# Patient Record
Sex: FEMALE | Race: WHITE | NOT HISPANIC OR LATINO
[De-identification: names, ages, dates, MRNs, and addresses within clinical notes are randomized per-mention and may not be internally consistent; named-entity substitution may affect disease eponyms.]

---

## 2017-12-18 ENCOUNTER — APPOINTMENT (OUTPATIENT)
Dept: ORTHOPEDIC SURGERY | Facility: CLINIC | Age: 62
End: 2017-12-18
Payer: COMMERCIAL

## 2017-12-18 VITALS
HEIGHT: 58 IN | DIASTOLIC BLOOD PRESSURE: 72 MMHG | BODY MASS INDEX: 25.4 KG/M2 | WEIGHT: 121 LBS | SYSTOLIC BLOOD PRESSURE: 118 MMHG | HEART RATE: 72 BPM

## 2017-12-18 DIAGNOSIS — Z78.9 OTHER SPECIFIED HEALTH STATUS: ICD-10-CM

## 2017-12-18 DIAGNOSIS — Z86.39 PERSONAL HISTORY OF OTHER ENDOCRINE, NUTRITIONAL AND METABOLIC DISEASE: ICD-10-CM

## 2017-12-18 DIAGNOSIS — Z87.39 PERSONAL HISTORY OF OTHER DISEASES OF THE MUSCULOSKELETAL SYSTEM AND CONNECTIVE TISSUE: ICD-10-CM

## 2017-12-18 DIAGNOSIS — Z82.62 FAMILY HISTORY OF OSTEOPOROSIS: ICD-10-CM

## 2017-12-18 PROCEDURE — 99244 OFF/OP CNSLTJ NEW/EST MOD 40: CPT

## 2017-12-18 PROCEDURE — 73564 X-RAY EXAM KNEE 4 OR MORE: CPT | Mod: 50

## 2017-12-18 RX ORDER — IBUPROFEN 800 MG/1
TABLET, FILM COATED ORAL
Refills: 0 | Status: ACTIVE | COMMUNITY

## 2017-12-18 RX ORDER — CANAGLIFLOZIN AND METFORMIN HYDROCHLORIDE 150; 1000 MG/1; MG/1
TABLET, FILM COATED ORAL
Refills: 0 | Status: ACTIVE | COMMUNITY

## 2017-12-26 ENCOUNTER — OUTPATIENT (OUTPATIENT)
Dept: OUTPATIENT SERVICES | Facility: HOSPITAL | Age: 62
LOS: 1 days | End: 2017-12-26
Payer: COMMERCIAL

## 2017-12-26 VITALS
SYSTOLIC BLOOD PRESSURE: 130 MMHG | HEIGHT: 59 IN | WEIGHT: 119.93 LBS | OXYGEN SATURATION: 98 % | DIASTOLIC BLOOD PRESSURE: 78 MMHG | HEART RATE: 65 BPM | RESPIRATION RATE: 16 BRPM | TEMPERATURE: 97 F

## 2017-12-26 DIAGNOSIS — M17.12 UNILATERAL PRIMARY OSTEOARTHRITIS, LEFT KNEE: ICD-10-CM

## 2017-12-26 DIAGNOSIS — Z98.890 OTHER SPECIFIED POSTPROCEDURAL STATES: Chronic | ICD-10-CM

## 2017-12-26 DIAGNOSIS — M19.90 UNSPECIFIED OSTEOARTHRITIS, UNSPECIFIED SITE: ICD-10-CM

## 2017-12-26 DIAGNOSIS — E11.9 TYPE 2 DIABETES MELLITUS WITHOUT COMPLICATIONS: ICD-10-CM

## 2017-12-26 LAB
APPEARANCE UR: CLEAR — SIGNIFICANT CHANGE UP
BILIRUB UR-MCNC: NEGATIVE — SIGNIFICANT CHANGE UP
BLD GP AB SCN SERPL QL: NEGATIVE — SIGNIFICANT CHANGE UP
BLOOD UR QL VISUAL: NEGATIVE — SIGNIFICANT CHANGE UP
BUN SERPL-MCNC: 17 MG/DL — SIGNIFICANT CHANGE UP (ref 7–23)
CALCIUM SERPL-MCNC: 8.9 MG/DL — SIGNIFICANT CHANGE UP (ref 8.4–10.5)
CHLORIDE SERPL-SCNC: 102 MMOL/L — SIGNIFICANT CHANGE UP (ref 98–107)
CO2 SERPL-SCNC: 26 MMOL/L — SIGNIFICANT CHANGE UP (ref 22–31)
COLOR SPEC: YELLOW — SIGNIFICANT CHANGE UP
CREAT SERPL-MCNC: 0.56 MG/DL — SIGNIFICANT CHANGE UP (ref 0.5–1.3)
GLUCOSE SERPL-MCNC: 79 MG/DL — SIGNIFICANT CHANGE UP (ref 70–99)
GLUCOSE UR-MCNC: 100 — HIGH
HBA1C BLD-MCNC: 5.8 % — HIGH (ref 4–5.6)
HCT VFR BLD CALC: 40.3 % — SIGNIFICANT CHANGE UP (ref 34.5–45)
HGB BLD-MCNC: 13.1 G/DL — SIGNIFICANT CHANGE UP (ref 11.5–15.5)
KETONES UR-MCNC: NEGATIVE — SIGNIFICANT CHANGE UP
LEUKOCYTE ESTERASE UR-ACNC: NEGATIVE — SIGNIFICANT CHANGE UP
MCHC RBC-ENTMCNC: 30.5 PG — SIGNIFICANT CHANGE UP (ref 27–34)
MCHC RBC-ENTMCNC: 32.5 % — SIGNIFICANT CHANGE UP (ref 32–36)
MCV RBC AUTO: 93.9 FL — SIGNIFICANT CHANGE UP (ref 80–100)
NITRITE UR-MCNC: NEGATIVE — SIGNIFICANT CHANGE UP
NRBC # FLD: 0 — SIGNIFICANT CHANGE UP
PH UR: 6 — SIGNIFICANT CHANGE UP (ref 4.6–8)
PLATELET # BLD AUTO: 261 K/UL — SIGNIFICANT CHANGE UP (ref 150–400)
PMV BLD: 10.1 FL — SIGNIFICANT CHANGE UP (ref 7–13)
POTASSIUM SERPL-MCNC: 4.2 MMOL/L — SIGNIFICANT CHANGE UP (ref 3.5–5.3)
POTASSIUM SERPL-SCNC: 4.2 MMOL/L — SIGNIFICANT CHANGE UP (ref 3.5–5.3)
PROT UR-MCNC: 10 MG/DL — SIGNIFICANT CHANGE UP
RBC # BLD: 4.29 M/UL — SIGNIFICANT CHANGE UP (ref 3.8–5.2)
RBC # FLD: 12.5 % — SIGNIFICANT CHANGE UP (ref 10.3–14.5)
RH IG SCN BLD-IMP: POSITIVE — SIGNIFICANT CHANGE UP
SODIUM SERPL-SCNC: 141 MMOL/L — SIGNIFICANT CHANGE UP (ref 135–145)
SP GR SPEC: 1.02 — SIGNIFICANT CHANGE UP (ref 1–1.04)
UROBILINOGEN FLD QL: NORMAL MG/DL — SIGNIFICANT CHANGE UP
WBC # BLD: 4.77 K/UL — SIGNIFICANT CHANGE UP (ref 3.8–10.5)
WBC # FLD AUTO: 4.77 K/UL — SIGNIFICANT CHANGE UP (ref 3.8–10.5)

## 2017-12-26 PROCEDURE — 93010 ELECTROCARDIOGRAM REPORT: CPT

## 2017-12-26 RX ORDER — GABAPENTIN 400 MG/1
300 CAPSULE ORAL ONCE
Qty: 0 | Refills: 0 | Status: COMPLETED | OUTPATIENT
Start: 2018-01-12 | End: 2018-01-12

## 2017-12-26 RX ORDER — ACETAMINOPHEN 500 MG
975 TABLET ORAL ONCE
Qty: 0 | Refills: 0 | Status: COMPLETED | OUTPATIENT
Start: 2018-01-12 | End: 2018-01-12

## 2017-12-26 RX ORDER — SODIUM CHLORIDE 9 MG/ML
1000 INJECTION, SOLUTION INTRAVENOUS
Qty: 0 | Refills: 0 | Status: DISCONTINUED | OUTPATIENT
Start: 2018-01-12 | End: 2018-01-12

## 2017-12-26 RX ORDER — PANTOPRAZOLE SODIUM 20 MG/1
40 TABLET, DELAYED RELEASE ORAL ONCE
Qty: 0 | Refills: 0 | Status: COMPLETED | OUTPATIENT
Start: 2018-01-12 | End: 2018-01-12

## 2017-12-26 RX ORDER — TRAMADOL HYDROCHLORIDE 50 MG/1
50 TABLET ORAL ONCE
Qty: 0 | Refills: 0 | Status: DISCONTINUED | OUTPATIENT
Start: 2018-01-12 | End: 2018-01-12

## 2017-12-26 NOTE — H&P PST ADULT - NEGATIVE NEUROLOGICAL SYMPTOMS
no weakness/no paresthesias/no generalized seizures/no transient paralysis/no vertigo/no loss of sensation

## 2017-12-26 NOTE — H&P PST ADULT - NEGATIVE MUSCULOSKELETAL SYMPTOMS
no arm pain L/no arm pain R/no joint swelling/no myalgia/no muscle cramps/no muscle weakness/no neck pain/no back pain/no arthralgia/no stiffness

## 2017-12-26 NOTE — H&P PST ADULT - PROBLEM SELECTOR PLAN 2
Pt instructed to hold invokamet 1/10, 1/11, and 1/12.    OR booking notified of pt's DM status via fax.

## 2017-12-26 NOTE — H&P PST ADULT - HISTORY OF PRESENT ILLNESS
61 yo female with PMH DM2 and osteoarthritis presents to pre surgical testing.  Pt reports she has experienced right knee pain for 3 years, worsening over past year.  Pt reports Xrays done, revealed severe osteoarthritis.  Pt is scheduled for left total knee replacement on 1/12/18.

## 2017-12-26 NOTE — H&P PST ADULT - FAMILY HISTORY
Mother  Still living? No  Family history of diabetes mellitus type II, Age at diagnosis: Age Unknown

## 2017-12-26 NOTE — H&P PST ADULT - PROBLEM SELECTOR PLAN 1
Pt is scheduled for left total knee replacement on 1/12/18.  Pre op instructions including chlorhexidine wash given and pt able to verbalize understanding.

## 2017-12-26 NOTE — H&P PST ADULT - NSANTHOSAYNRD_GEN_A_CORE
No. LIZBETH screening performed.  STOP BANG Legend: 0-2 = LOW Risk; 3-4 = INTERMEDIATE Risk; 5-8 = HIGH Risk

## 2017-12-26 NOTE — H&P PST ADULT - MUSCULOSKELETAL
details… detailed exam no joint swelling/no joint erythema/no joint warmth/normal strength/no calf tenderness/ROM intact ROM intact/no calf tenderness/left knee decreased strength due to pain/no joint warmth/no joint swelling/no joint erythema

## 2017-12-27 LAB
BACTERIA UR CULT: SIGNIFICANT CHANGE UP
SPECIMEN SOURCE: SIGNIFICANT CHANGE UP
SPECIMEN SOURCE: SIGNIFICANT CHANGE UP

## 2017-12-28 LAB — BACTERIA NPH CULT: SIGNIFICANT CHANGE UP

## 2018-01-12 ENCOUNTER — RESULT REVIEW (OUTPATIENT)
Age: 63
End: 2018-01-12

## 2018-01-12 ENCOUNTER — TRANSCRIPTION ENCOUNTER (OUTPATIENT)
Age: 63
End: 2018-01-12

## 2018-01-12 ENCOUNTER — INPATIENT (INPATIENT)
Facility: HOSPITAL | Age: 63
LOS: 4 days | Discharge: HOME HEALTH | End: 2018-01-17
Attending: ORTHOPAEDIC SURGERY | Admitting: ORTHOPAEDIC SURGERY
Payer: COMMERCIAL

## 2018-01-12 ENCOUNTER — APPOINTMENT (OUTPATIENT)
Dept: ORTHOPEDIC SURGERY | Facility: HOSPITAL | Age: 63
End: 2018-01-12

## 2018-01-12 VITALS
HEART RATE: 66 BPM | RESPIRATION RATE: 14 BRPM | HEIGHT: 59 IN | OXYGEN SATURATION: 99 % | SYSTOLIC BLOOD PRESSURE: 121 MMHG | DIASTOLIC BLOOD PRESSURE: 67 MMHG | WEIGHT: 119.93 LBS | TEMPERATURE: 99 F

## 2018-01-12 DIAGNOSIS — M17.12 UNILATERAL PRIMARY OSTEOARTHRITIS, LEFT KNEE: ICD-10-CM

## 2018-01-12 DIAGNOSIS — Z98.890 OTHER SPECIFIED POSTPROCEDURAL STATES: Chronic | ICD-10-CM

## 2018-01-12 LAB
BUN SERPL-MCNC: 16 MG/DL — SIGNIFICANT CHANGE UP (ref 7–23)
CALCIUM SERPL-MCNC: 8.5 MG/DL — SIGNIFICANT CHANGE UP (ref 8.4–10.5)
CHLORIDE SERPL-SCNC: 104 MMOL/L — SIGNIFICANT CHANGE UP (ref 98–107)
CO2 SERPL-SCNC: 27 MMOL/L — SIGNIFICANT CHANGE UP (ref 22–31)
CREAT SERPL-MCNC: 0.52 MG/DL — SIGNIFICANT CHANGE UP (ref 0.5–1.3)
GLUCOSE BLDC GLUCOMTR-MCNC: 133 MG/DL — HIGH (ref 70–99)
GLUCOSE BLDC GLUCOMTR-MCNC: 94 MG/DL — SIGNIFICANT CHANGE UP (ref 70–99)
GLUCOSE BLDC GLUCOMTR-MCNC: 98 MG/DL — SIGNIFICANT CHANGE UP (ref 70–99)
GLUCOSE SERPL-MCNC: 115 MG/DL — HIGH (ref 70–99)
HCT VFR BLD CALC: 33.6 % — LOW (ref 34.5–45)
HGB BLD-MCNC: 11 G/DL — LOW (ref 11.5–15.5)
MCHC RBC-ENTMCNC: 30.6 PG — SIGNIFICANT CHANGE UP (ref 27–34)
MCHC RBC-ENTMCNC: 32.7 % — SIGNIFICANT CHANGE UP (ref 32–36)
MCV RBC AUTO: 93.3 FL — SIGNIFICANT CHANGE UP (ref 80–100)
NRBC # FLD: 0 — SIGNIFICANT CHANGE UP
PLATELET # BLD AUTO: 300 K/UL — SIGNIFICANT CHANGE UP (ref 150–400)
PMV BLD: 9.4 FL — SIGNIFICANT CHANGE UP (ref 7–13)
POTASSIUM SERPL-MCNC: 3.8 MMOL/L — SIGNIFICANT CHANGE UP (ref 3.5–5.3)
POTASSIUM SERPL-SCNC: 3.8 MMOL/L — SIGNIFICANT CHANGE UP (ref 3.5–5.3)
RBC # BLD: 3.6 M/UL — LOW (ref 3.8–5.2)
RBC # FLD: 12.3 % — SIGNIFICANT CHANGE UP (ref 10.3–14.5)
RH IG SCN BLD-IMP: POSITIVE — SIGNIFICANT CHANGE UP
SODIUM SERPL-SCNC: 142 MMOL/L — SIGNIFICANT CHANGE UP (ref 135–145)
WBC # BLD: 10.5 K/UL — SIGNIFICANT CHANGE UP (ref 3.8–10.5)
WBC # FLD AUTO: 10.5 K/UL — SIGNIFICANT CHANGE UP (ref 3.8–10.5)

## 2018-01-12 PROCEDURE — 88311 DECALCIFY TISSUE: CPT | Mod: 26

## 2018-01-12 PROCEDURE — 73560 X-RAY EXAM OF KNEE 1 OR 2: CPT | Mod: 26,LT

## 2018-01-12 PROCEDURE — 27447 TOTAL KNEE ARTHROPLASTY: CPT | Mod: LT

## 2018-01-12 PROCEDURE — 88305 TISSUE EXAM BY PATHOLOGIST: CPT | Mod: 26

## 2018-01-12 RX ORDER — SODIUM CHLORIDE 9 MG/ML
1000 INJECTION, SOLUTION INTRAVENOUS
Qty: 0 | Refills: 0 | Status: DISCONTINUED | OUTPATIENT
Start: 2018-01-12 | End: 2018-01-17

## 2018-01-12 RX ORDER — OXYCODONE HYDROCHLORIDE 5 MG/1
5 TABLET ORAL EVERY 4 HOURS
Qty: 0 | Refills: 0 | Status: DISCONTINUED | OUTPATIENT
Start: 2018-01-12 | End: 2018-01-13

## 2018-01-12 RX ORDER — DEXTROSE 50 % IN WATER 50 %
25 SYRINGE (ML) INTRAVENOUS ONCE
Qty: 0 | Refills: 0 | Status: DISCONTINUED | OUTPATIENT
Start: 2018-01-12 | End: 2018-01-17

## 2018-01-12 RX ORDER — OXYCODONE HYDROCHLORIDE 5 MG/1
10 TABLET ORAL
Qty: 0 | Refills: 0 | Status: DISCONTINUED | OUTPATIENT
Start: 2018-01-12 | End: 2018-01-12

## 2018-01-12 RX ORDER — MAGNESIUM HYDROXIDE 400 MG/1
30 TABLET, CHEWABLE ORAL DAILY
Qty: 0 | Refills: 0 | Status: DISCONTINUED | OUTPATIENT
Start: 2018-01-12 | End: 2018-01-17

## 2018-01-12 RX ORDER — METOCLOPRAMIDE HCL 10 MG
5 TABLET ORAL ONCE
Qty: 0 | Refills: 0 | Status: DISCONTINUED | OUTPATIENT
Start: 2018-01-12 | End: 2018-01-17

## 2018-01-12 RX ORDER — DOCUSATE SODIUM 100 MG
100 CAPSULE ORAL THREE TIMES A DAY
Qty: 0 | Refills: 0 | Status: DISCONTINUED | OUTPATIENT
Start: 2018-01-12 | End: 2018-01-17

## 2018-01-12 RX ORDER — ONDANSETRON 8 MG/1
4 TABLET, FILM COATED ORAL ONCE
Qty: 0 | Refills: 0 | Status: COMPLETED | OUTPATIENT
Start: 2018-01-12 | End: 2018-01-12

## 2018-01-12 RX ORDER — KETOROLAC TROMETHAMINE 30 MG/ML
15 SYRINGE (ML) INJECTION ONCE
Qty: 0 | Refills: 0 | Status: DISCONTINUED | OUTPATIENT
Start: 2018-01-12 | End: 2018-01-14

## 2018-01-12 RX ORDER — ACETAMINOPHEN 500 MG
650 TABLET ORAL EVERY 6 HOURS
Qty: 0 | Refills: 0 | Status: COMPLETED | OUTPATIENT
Start: 2018-01-12 | End: 2018-01-15

## 2018-01-12 RX ORDER — OXYCODONE HYDROCHLORIDE 5 MG/1
5 TABLET ORAL EVERY 4 HOURS
Qty: 0 | Refills: 0 | Status: DISCONTINUED | OUTPATIENT
Start: 2018-01-12 | End: 2018-01-12

## 2018-01-12 RX ORDER — SENNA PLUS 8.6 MG/1
2 TABLET ORAL AT BEDTIME
Qty: 0 | Refills: 0 | Status: DISCONTINUED | OUTPATIENT
Start: 2018-01-12 | End: 2018-01-17

## 2018-01-12 RX ORDER — OXYCODONE HYDROCHLORIDE 5 MG/1
10 TABLET ORAL EVERY 4 HOURS
Qty: 0 | Refills: 0 | Status: DISCONTINUED | OUTPATIENT
Start: 2018-01-12 | End: 2018-01-12

## 2018-01-12 RX ORDER — DEXTROSE 50 % IN WATER 50 %
12.5 SYRINGE (ML) INTRAVENOUS ONCE
Qty: 0 | Refills: 0 | Status: DISCONTINUED | OUTPATIENT
Start: 2018-01-12 | End: 2018-01-17

## 2018-01-12 RX ORDER — HYDROMORPHONE HYDROCHLORIDE 2 MG/ML
0.5 INJECTION INTRAMUSCULAR; INTRAVENOUS; SUBCUTANEOUS
Qty: 0 | Refills: 0 | Status: DISCONTINUED | OUTPATIENT
Start: 2018-01-12 | End: 2018-01-17

## 2018-01-12 RX ORDER — INSULIN LISPRO 100/ML
VIAL (ML) SUBCUTANEOUS
Qty: 0 | Refills: 0 | Status: DISCONTINUED | OUTPATIENT
Start: 2018-01-12 | End: 2018-01-17

## 2018-01-12 RX ORDER — KETOROLAC TROMETHAMINE 30 MG/ML
15 SYRINGE (ML) INJECTION ONCE
Qty: 0 | Refills: 0 | Status: DISCONTINUED | OUTPATIENT
Start: 2018-01-12 | End: 2018-01-12

## 2018-01-12 RX ORDER — ONDANSETRON 8 MG/1
4 TABLET, FILM COATED ORAL EVERY 6 HOURS
Qty: 0 | Refills: 0 | Status: DISCONTINUED | OUTPATIENT
Start: 2018-01-12 | End: 2018-01-17

## 2018-01-12 RX ORDER — PANTOPRAZOLE SODIUM 20 MG/1
40 TABLET, DELAYED RELEASE ORAL DAILY
Qty: 0 | Refills: 0 | Status: DISCONTINUED | OUTPATIENT
Start: 2018-01-12 | End: 2018-01-17

## 2018-01-12 RX ORDER — SODIUM CHLORIDE 9 MG/ML
1000 INJECTION INTRAMUSCULAR; INTRAVENOUS; SUBCUTANEOUS ONCE
Qty: 0 | Refills: 0 | Status: COMPLETED | OUTPATIENT
Start: 2018-01-12 | End: 2018-01-12

## 2018-01-12 RX ORDER — NALOXONE HYDROCHLORIDE 4 MG/.1ML
0.1 SPRAY NASAL
Qty: 0 | Refills: 0 | Status: DISCONTINUED | OUTPATIENT
Start: 2018-01-12 | End: 2018-01-17

## 2018-01-12 RX ORDER — ONDANSETRON 8 MG/1
4 TABLET, FILM COATED ORAL ONCE
Qty: 0 | Refills: 0 | Status: DISCONTINUED | OUTPATIENT
Start: 2018-01-12 | End: 2018-01-12

## 2018-01-12 RX ORDER — POLYETHYLENE GLYCOL 3350 17 G/17G
17 POWDER, FOR SOLUTION ORAL DAILY
Qty: 0 | Refills: 0 | Status: DISCONTINUED | OUTPATIENT
Start: 2018-01-12 | End: 2018-01-17

## 2018-01-12 RX ORDER — OXYCODONE HYDROCHLORIDE 5 MG/1
10 TABLET ORAL EVERY 4 HOURS
Qty: 0 | Refills: 0 | Status: DISCONTINUED | OUTPATIENT
Start: 2018-01-12 | End: 2018-01-13

## 2018-01-12 RX ORDER — HYDROMORPHONE HYDROCHLORIDE 2 MG/ML
0.5 INJECTION INTRAMUSCULAR; INTRAVENOUS; SUBCUTANEOUS EVERY 4 HOURS
Qty: 0 | Refills: 0 | Status: DISCONTINUED | OUTPATIENT
Start: 2018-01-12 | End: 2018-01-13

## 2018-01-12 RX ORDER — ONDANSETRON 8 MG/1
4 TABLET, FILM COATED ORAL EVERY 6 HOURS
Qty: 0 | Refills: 0 | Status: DISCONTINUED | OUTPATIENT
Start: 2018-01-12 | End: 2018-01-12

## 2018-01-12 RX ORDER — SODIUM CHLORIDE 9 MG/ML
1000 INJECTION, SOLUTION INTRAVENOUS
Qty: 0 | Refills: 0 | Status: DISCONTINUED | OUTPATIENT
Start: 2018-01-12 | End: 2018-01-14

## 2018-01-12 RX ORDER — GABAPENTIN 400 MG/1
100 CAPSULE ORAL EVERY 8 HOURS
Qty: 0 | Refills: 0 | Status: DISCONTINUED | OUTPATIENT
Start: 2018-01-12 | End: 2018-01-17

## 2018-01-12 RX ORDER — OXYCODONE HYDROCHLORIDE 5 MG/1
5 TABLET ORAL
Qty: 0 | Refills: 0 | Status: DISCONTINUED | OUTPATIENT
Start: 2018-01-12 | End: 2018-01-12

## 2018-01-12 RX ORDER — ASPIRIN/CALCIUM CARB/MAGNESIUM 324 MG
325 TABLET ORAL
Qty: 0 | Refills: 0 | Status: DISCONTINUED | OUTPATIENT
Start: 2018-01-12 | End: 2018-01-17

## 2018-01-12 RX ORDER — DEXTROSE 50 % IN WATER 50 %
1 SYRINGE (ML) INTRAVENOUS ONCE
Qty: 0 | Refills: 0 | Status: DISCONTINUED | OUTPATIENT
Start: 2018-01-12 | End: 2018-01-17

## 2018-01-12 RX ORDER — HYDROMORPHONE HYDROCHLORIDE 2 MG/ML
0.5 INJECTION INTRAMUSCULAR; INTRAVENOUS; SUBCUTANEOUS
Qty: 0 | Refills: 0 | Status: DISCONTINUED | OUTPATIENT
Start: 2018-01-12 | End: 2018-01-12

## 2018-01-12 RX ORDER — GLUCAGON INJECTION, SOLUTION 0.5 MG/.1ML
1 INJECTION, SOLUTION SUBCUTANEOUS ONCE
Qty: 0 | Refills: 0 | Status: DISCONTINUED | OUTPATIENT
Start: 2018-01-12 | End: 2018-01-17

## 2018-01-12 RX ORDER — MORPHINE SULFATE 50 MG/1
0.2 CAPSULE, EXTENDED RELEASE ORAL ONCE
Qty: 0 | Refills: 0 | Status: DISCONTINUED | OUTPATIENT
Start: 2018-01-12 | End: 2018-01-16

## 2018-01-12 RX ORDER — TRAMADOL HYDROCHLORIDE 50 MG/1
50 TABLET ORAL EVERY 8 HOURS
Qty: 0 | Refills: 0 | Status: DISCONTINUED | OUTPATIENT
Start: 2018-01-12 | End: 2018-01-17

## 2018-01-12 RX ORDER — SODIUM CHLORIDE 9 MG/ML
1000 INJECTION INTRAMUSCULAR; INTRAVENOUS; SUBCUTANEOUS ONCE
Qty: 0 | Refills: 0 | Status: COMPLETED | OUTPATIENT
Start: 2018-01-13 | End: 2018-01-13

## 2018-01-12 RX ORDER — METOCLOPRAMIDE HCL 10 MG
5 TABLET ORAL ONCE
Qty: 0 | Refills: 0 | Status: COMPLETED | OUTPATIENT
Start: 2018-01-12 | End: 2018-01-12

## 2018-01-12 RX ORDER — CELECOXIB 200 MG/1
200 CAPSULE ORAL
Qty: 0 | Refills: 0 | Status: DISCONTINUED | OUTPATIENT
Start: 2018-01-14 | End: 2018-01-17

## 2018-01-12 RX ORDER — CEFAZOLIN SODIUM 1 G
2000 VIAL (EA) INJECTION EVERY 8 HOURS
Qty: 0 | Refills: 0 | Status: COMPLETED | OUTPATIENT
Start: 2018-01-12 | End: 2018-01-13

## 2018-01-12 RX ORDER — INSULIN LISPRO 100/ML
VIAL (ML) SUBCUTANEOUS AT BEDTIME
Qty: 0 | Refills: 0 | Status: DISCONTINUED | OUTPATIENT
Start: 2018-01-12 | End: 2018-01-17

## 2018-01-12 RX ADMIN — GABAPENTIN 300 MILLIGRAM(S): 400 CAPSULE ORAL at 11:07

## 2018-01-12 RX ADMIN — HYDROMORPHONE HYDROCHLORIDE 0.5 MILLIGRAM(S): 2 INJECTION INTRAMUSCULAR; INTRAVENOUS; SUBCUTANEOUS at 22:00

## 2018-01-12 RX ADMIN — Medication 5 MILLIGRAM(S): at 21:45

## 2018-01-12 RX ADMIN — ONDANSETRON 4 MILLIGRAM(S): 8 TABLET, FILM COATED ORAL at 19:04

## 2018-01-12 RX ADMIN — ONDANSETRON 4 MILLIGRAM(S): 8 TABLET, FILM COATED ORAL at 16:40

## 2018-01-12 RX ADMIN — Medication 650 MILLIGRAM(S): at 19:00

## 2018-01-12 RX ADMIN — HYDROMORPHONE HYDROCHLORIDE 0.5 MILLIGRAM(S): 2 INJECTION INTRAMUSCULAR; INTRAVENOUS; SUBCUTANEOUS at 16:40

## 2018-01-12 RX ADMIN — HYDROMORPHONE HYDROCHLORIDE 0.5 MILLIGRAM(S): 2 INJECTION INTRAMUSCULAR; INTRAVENOUS; SUBCUTANEOUS at 17:16

## 2018-01-12 RX ADMIN — SODIUM CHLORIDE 1000 MILLILITER(S): 9 INJECTION INTRAMUSCULAR; INTRAVENOUS; SUBCUTANEOUS at 20:57

## 2018-01-12 RX ADMIN — TRAMADOL HYDROCHLORIDE 50 MILLIGRAM(S): 50 TABLET ORAL at 11:07

## 2018-01-12 RX ADMIN — HYDROMORPHONE HYDROCHLORIDE 0.5 MILLIGRAM(S): 2 INJECTION INTRAMUSCULAR; INTRAVENOUS; SUBCUTANEOUS at 21:45

## 2018-01-12 RX ADMIN — PANTOPRAZOLE SODIUM 40 MILLIGRAM(S): 20 TABLET, DELAYED RELEASE ORAL at 11:07

## 2018-01-12 RX ADMIN — Medication 15 MILLIGRAM(S): at 17:56

## 2018-01-12 RX ADMIN — SODIUM CHLORIDE 125 MILLILITER(S): 9 INJECTION, SOLUTION INTRAVENOUS at 14:47

## 2018-01-12 RX ADMIN — Medication 100 MILLIGRAM(S): at 19:19

## 2018-01-12 RX ADMIN — Medication 650 MILLIGRAM(S): at 18:34

## 2018-01-12 RX ADMIN — Medication 975 MILLIGRAM(S): at 11:07

## 2018-01-12 RX ADMIN — SODIUM CHLORIDE 30 MILLILITER(S): 9 INJECTION, SOLUTION INTRAVENOUS at 11:07

## 2018-01-12 RX ADMIN — OXYCODONE HYDROCHLORIDE 10 MILLIGRAM(S): 5 TABLET ORAL at 19:00

## 2018-01-12 RX ADMIN — OXYCODONE HYDROCHLORIDE 10 MILLIGRAM(S): 5 TABLET ORAL at 18:34

## 2018-01-12 NOTE — BRIEF OPERATIVE NOTE - PROCEDURE
<<-----Click on this checkbox to enter Procedure Arthroplasty, knee, total  01/12/2018  LEFT  Active  SSTEIN2

## 2018-01-12 NOTE — PROGRESS NOTE ADULT - SUBJECTIVE AND OBJECTIVE BOX
Patient is a 62y old  female POD 0 from L TKA    Nauseas with multiple episodes of vomiting. Reglan ordered for continued vomiting. Otherwise pain is controlled. No Chest Pain, SOB    T(C): 36.4 (01-12-18 @ 18:00), Max: 37.1 (01-12-18 @ 10:29)  HR: 88 (01-12-18 @ 18:00) (66 - 88)  BP: 122/63 (01-12-18 @ 18:00) (89/61 - 122/63)  RR: 12 (01-12-18 @ 18:00) (12 - 19)  SpO2: 100% (01-12-18 @ 18:00) (96% - 100%)  Wt(kg): --    Exam:  Alert and Mccleary, No Acute Distress  Pulm: CTAB    LLE:   Aquacell dressing C/D/I. Sensation grossly intact to light touch.  TA/EHL/FHL/Gastroc  Calves soft, non-tender bilaterally  (+) PF/DF  (+) Distal pulses                          11.0   10.50 )-----------( 300      ( 12 Jan 2018 14:41 )             33.6    01-12    142  |  104  |  16  ----------------------------<  115<H>  3.8   |  27  |  0.52    Ca    8.5      12 Jan 2018 14:41    Patient is a 62y old  female POD 0 from L TKA  -pain control  -PT/WBAT/OOB  -DVT ppx - ASA BID  -dispo plan

## 2018-01-13 DIAGNOSIS — Z29.9 ENCOUNTER FOR PROPHYLACTIC MEASURES, UNSPECIFIED: ICD-10-CM

## 2018-01-13 DIAGNOSIS — J45.909 UNSPECIFIED ASTHMA, UNCOMPLICATED: ICD-10-CM

## 2018-01-13 LAB
BUN SERPL-MCNC: 9 MG/DL — SIGNIFICANT CHANGE UP (ref 7–23)
CALCIUM SERPL-MCNC: 8.6 MG/DL — SIGNIFICANT CHANGE UP (ref 8.4–10.5)
CHLORIDE SERPL-SCNC: 104 MMOL/L — SIGNIFICANT CHANGE UP (ref 98–107)
CO2 SERPL-SCNC: 26 MMOL/L — SIGNIFICANT CHANGE UP (ref 22–31)
CREAT SERPL-MCNC: 0.57 MG/DL — SIGNIFICANT CHANGE UP (ref 0.5–1.3)
GLUCOSE BLDC GLUCOMTR-MCNC: 102 MG/DL — HIGH (ref 70–99)
GLUCOSE BLDC GLUCOMTR-MCNC: 105 MG/DL — HIGH (ref 70–99)
GLUCOSE BLDC GLUCOMTR-MCNC: 106 MG/DL — HIGH (ref 70–99)
GLUCOSE BLDC GLUCOMTR-MCNC: 110 MG/DL — HIGH (ref 70–99)
GLUCOSE SERPL-MCNC: 120 MG/DL — HIGH (ref 70–99)
HCT VFR BLD CALC: 32.1 % — LOW (ref 34.5–45)
HGB BLD-MCNC: 10.5 G/DL — LOW (ref 11.5–15.5)
MCHC RBC-ENTMCNC: 30.6 PG — SIGNIFICANT CHANGE UP (ref 27–34)
MCHC RBC-ENTMCNC: 32.7 % — SIGNIFICANT CHANGE UP (ref 32–36)
MCV RBC AUTO: 93.6 FL — SIGNIFICANT CHANGE UP (ref 80–100)
NRBC # FLD: 0 — SIGNIFICANT CHANGE UP
PLATELET # BLD AUTO: 290 K/UL — SIGNIFICANT CHANGE UP (ref 150–400)
PMV BLD: 9.7 FL — SIGNIFICANT CHANGE UP (ref 7–13)
POTASSIUM SERPL-MCNC: 4.5 MMOL/L — SIGNIFICANT CHANGE UP (ref 3.5–5.3)
POTASSIUM SERPL-SCNC: 4.5 MMOL/L — SIGNIFICANT CHANGE UP (ref 3.5–5.3)
RBC # BLD: 3.43 M/UL — LOW (ref 3.8–5.2)
RBC # FLD: 12.3 % — SIGNIFICANT CHANGE UP (ref 10.3–14.5)
SODIUM SERPL-SCNC: 142 MMOL/L — SIGNIFICANT CHANGE UP (ref 135–145)
WBC # BLD: 10.07 K/UL — SIGNIFICANT CHANGE UP (ref 3.8–10.5)
WBC # FLD AUTO: 10.07 K/UL — SIGNIFICANT CHANGE UP (ref 3.8–10.5)

## 2018-01-13 PROCEDURE — 99223 1ST HOSP IP/OBS HIGH 75: CPT

## 2018-01-13 RX ORDER — HYDROMORPHONE HYDROCHLORIDE 2 MG/ML
0.5 INJECTION INTRAMUSCULAR; INTRAVENOUS; SUBCUTANEOUS
Qty: 0 | Refills: 0 | Status: DISCONTINUED | OUTPATIENT
Start: 2018-01-13 | End: 2018-01-17

## 2018-01-13 RX ORDER — OXYCODONE HYDROCHLORIDE 5 MG/1
5 TABLET ORAL
Qty: 0 | Refills: 0 | Status: DISCONTINUED | OUTPATIENT
Start: 2018-01-13 | End: 2018-01-14

## 2018-01-13 RX ORDER — OXYCODONE HYDROCHLORIDE 5 MG/1
10 TABLET ORAL
Qty: 0 | Refills: 0 | Status: DISCONTINUED | OUTPATIENT
Start: 2018-01-13 | End: 2018-01-14

## 2018-01-13 RX ADMIN — OXYCODONE HYDROCHLORIDE 10 MILLIGRAM(S): 5 TABLET ORAL at 14:07

## 2018-01-13 RX ADMIN — Medication 650 MILLIGRAM(S): at 12:25

## 2018-01-13 RX ADMIN — HYDROMORPHONE HYDROCHLORIDE 0.5 MILLIGRAM(S): 2 INJECTION INTRAMUSCULAR; INTRAVENOUS; SUBCUTANEOUS at 22:23

## 2018-01-13 RX ADMIN — OXYCODONE HYDROCHLORIDE 10 MILLIGRAM(S): 5 TABLET ORAL at 19:00

## 2018-01-13 RX ADMIN — SENNA PLUS 2 TABLET(S): 8.6 TABLET ORAL at 00:35

## 2018-01-13 RX ADMIN — Medication 100 MILLIGRAM(S): at 21:23

## 2018-01-13 RX ADMIN — SODIUM CHLORIDE 1000 MILLILITER(S): 9 INJECTION INTRAMUSCULAR; INTRAVENOUS; SUBCUTANEOUS at 06:07

## 2018-01-13 RX ADMIN — Medication 325 MILLIGRAM(S): at 18:20

## 2018-01-13 RX ADMIN — OXYCODONE HYDROCHLORIDE 10 MILLIGRAM(S): 5 TABLET ORAL at 21:23

## 2018-01-13 RX ADMIN — OXYCODONE HYDROCHLORIDE 10 MILLIGRAM(S): 5 TABLET ORAL at 09:02

## 2018-01-13 RX ADMIN — TRAMADOL HYDROCHLORIDE 50 MILLIGRAM(S): 50 TABLET ORAL at 01:30

## 2018-01-13 RX ADMIN — TRAMADOL HYDROCHLORIDE 50 MILLIGRAM(S): 50 TABLET ORAL at 14:53

## 2018-01-13 RX ADMIN — PANTOPRAZOLE SODIUM 40 MILLIGRAM(S): 20 TABLET, DELAYED RELEASE ORAL at 12:25

## 2018-01-13 RX ADMIN — Medication 100 MILLIGRAM(S): at 06:06

## 2018-01-13 RX ADMIN — TRAMADOL HYDROCHLORIDE 50 MILLIGRAM(S): 50 TABLET ORAL at 06:30

## 2018-01-13 RX ADMIN — GABAPENTIN 100 MILLIGRAM(S): 400 CAPSULE ORAL at 21:23

## 2018-01-13 RX ADMIN — OXYCODONE HYDROCHLORIDE 10 MILLIGRAM(S): 5 TABLET ORAL at 22:00

## 2018-01-13 RX ADMIN — GABAPENTIN 100 MILLIGRAM(S): 400 CAPSULE ORAL at 06:05

## 2018-01-13 RX ADMIN — TRAMADOL HYDROCHLORIDE 50 MILLIGRAM(S): 50 TABLET ORAL at 00:41

## 2018-01-13 RX ADMIN — Medication 325 MILLIGRAM(S): at 06:06

## 2018-01-13 RX ADMIN — POLYETHYLENE GLYCOL 3350 17 GRAM(S): 17 POWDER, FOR SOLUTION ORAL at 12:25

## 2018-01-13 RX ADMIN — TRAMADOL HYDROCHLORIDE 50 MILLIGRAM(S): 50 TABLET ORAL at 06:06

## 2018-01-13 RX ADMIN — GABAPENTIN 100 MILLIGRAM(S): 400 CAPSULE ORAL at 14:07

## 2018-01-13 RX ADMIN — OXYCODONE HYDROCHLORIDE 10 MILLIGRAM(S): 5 TABLET ORAL at 18:21

## 2018-01-13 RX ADMIN — SODIUM CHLORIDE 125 MILLILITER(S): 9 INJECTION, SOLUTION INTRAVENOUS at 06:07

## 2018-01-13 RX ADMIN — Medication 650 MILLIGRAM(S): at 18:21

## 2018-01-13 RX ADMIN — HYDROMORPHONE HYDROCHLORIDE 0.5 MILLIGRAM(S): 2 INJECTION INTRAMUSCULAR; INTRAVENOUS; SUBCUTANEOUS at 22:38

## 2018-01-13 RX ADMIN — HYDROMORPHONE HYDROCHLORIDE 0.5 MILLIGRAM(S): 2 INJECTION INTRAMUSCULAR; INTRAVENOUS; SUBCUTANEOUS at 11:00

## 2018-01-13 RX ADMIN — HYDROMORPHONE HYDROCHLORIDE 0.5 MILLIGRAM(S): 2 INJECTION INTRAMUSCULAR; INTRAVENOUS; SUBCUTANEOUS at 11:15

## 2018-01-13 RX ADMIN — Medication 650 MILLIGRAM(S): at 03:00

## 2018-01-13 RX ADMIN — Medication 650 MILLIGRAM(S): at 02:31

## 2018-01-13 RX ADMIN — GABAPENTIN 100 MILLIGRAM(S): 400 CAPSULE ORAL at 00:36

## 2018-01-13 RX ADMIN — Medication 100 MILLIGRAM(S): at 14:07

## 2018-01-13 RX ADMIN — Medication 100 MILLIGRAM(S): at 02:31

## 2018-01-13 RX ADMIN — Medication 100 MILLIGRAM(S): at 00:35

## 2018-01-13 RX ADMIN — TRAMADOL HYDROCHLORIDE 50 MILLIGRAM(S): 50 TABLET ORAL at 21:23

## 2018-01-13 RX ADMIN — OXYCODONE HYDROCHLORIDE 10 MILLIGRAM(S): 5 TABLET ORAL at 14:50

## 2018-01-13 RX ADMIN — OXYCODONE HYDROCHLORIDE 10 MILLIGRAM(S): 5 TABLET ORAL at 09:45

## 2018-01-13 NOTE — PHYSICAL THERAPY INITIAL EVALUATION ADULT - GENERAL OBSERVATIONS, REHAB EVAL
pt seen supine in bed, ace wrap dressing on left knee, i.v. line intact. Patient agreed to participate in PT

## 2018-01-13 NOTE — PHYSICAL THERAPY INITIAL EVALUATION ADULT - ACTIVE RANGE OF MOTION EXAMINATION, REHAB EVAL
Right LE Active ROM was WFL (within functional limits)/left hip flex 0-90, knee flex 0-50, ankle wfl/bilateral upper extremity Active ROM was WFL (within functional limits)

## 2018-01-13 NOTE — PHYSICAL THERAPY INITIAL EVALUATION ADULT - PLANNED THERAPY INTERVENTIONS, PT EVAL
bed mobility training/ROM/balance training/gait training/strengthening/stairs training/transfer training

## 2018-01-13 NOTE — PROGRESS NOTE ADULT - SUBJECTIVE AND OBJECTIVE BOX
ANESTHESIA POSTOP CHECK    62y Female POSTOP DAY 1 S/P     [ X] NO APPARENT ANESTHESIA COMPLICATIONS      Comments:

## 2018-01-13 NOTE — PROGRESS NOTE ADULT - SUBJECTIVE AND OBJECTIVE BOX
Patient seen and examined. Pain controlled. Some N/V overnight, No other acute events overnight. Patient will walked with PT.      MEDICATIONS  (STANDING):  acetaminophen   Tablet. 650 milliGRAM(s) Oral every 6 hours  aspirin enteric coated 325 milliGRAM(s) Oral two times a day  ceFAZolin   IVPB 2000 milliGRAM(s) IV Intermittent every 8 hours  dextrose 5%. 1000 milliLiter(s) IV Continuous <Continuous>  dextrose 50% Injectable 12.5 Gram(s) IV Push once  dextrose 50% Injectable 25 Gram(s) IV Push once  dextrose 50% Injectable 25 Gram(s) IV Push once  docusate sodium 100 milliGRAM(s) Oral three times a day  gabapentin 100 milliGRAM(s) Oral every 8 hours  insulin lispro (HumaLOG) corrective regimen sliding scale   SubCutaneous three times a day before meals  insulin lispro (HumaLOG) corrective regimen sliding scale   SubCutaneous at bedtime  ketorolac   Injectable 15 milliGRAM(s) IV Push once  lactated ringers. 1000 milliLiter(s) IV Continuous <Continuous>  metoclopramide Injectable 5 milliGRAM(s) IV Push once  morphine PF Spinal 0.2 milliGRAM(s) IntraThecal. once  pantoprazole    Tablet 40 milliGRAM(s) Oral daily  polyethylene glycol 3350 17 Gram(s) Oral daily  sodium chloride 0.9% Bolus 1000 milliLiter(s) IV Bolus once  traMADol 50 milliGRAM(s) Oral every 8 hours    Allergies    No Known Allergies    Intolerances                            11.0   10.50 )-----------( 300      ( 12 Jan 2018 14:41 )             33.6     12 Jan 2018 14:41    142    |  104    |  16     ----------------------------<  115    3.8     |  27     |  0.52     Ca    8.5        12 Jan 2018 14:41        Vital Signs Last 24 Hrs  T(C): 36.7 (01-12-18 @ 23:00), Max: 37.1 (01-12-18 @ 10:29)  T(F): 98 (01-12-18 @ 23:00), Max: 98.7 (01-12-18 @ 10:29)  HR: 89 (01-12-18 @ 23:00) (66 - 89)  BP: 114/68 (01-12-18 @ 23:00) (89/61 - 122/63)  BP(mean): --  RR: 18 (01-12-18 @ 23:00) (12 - 19)  SpO2: 100% (01-12-18 @ 23:00) (96% - 100%)    Physical Exam  Gen: NAD  LLE:   Dressing c/d/i    +ehl/fhl/ta/gs function  L2-S1 silt  Dp/pt pulse intact  No calf ttp  Compartments soft    A/P: 62y Female sp L TKA POD 1  Pain control  DVT ppx, asa bid  PT/WBAT/OOB  FU labs  Ice/elevate  Medical management appreciated  Incentive spirometry  Dispo planning

## 2018-01-13 NOTE — CONSULT NOTE ADULT - PROBLEM SELECTOR RECOMMENDATION 9
-s/p L TKR on 1/12  -still with significant pain - on regimen of oxyIR PRN for moderate and dilaudid PRN severe pain as per Anesthesia/Pain Med, also on tramadol TID

## 2018-01-13 NOTE — PROGRESS NOTE ADULT - SUBJECTIVE AND OBJECTIVE BOX
Anesthesia Pain Management Service    SUBJECTIVE: Patient s/p spinal morphine with pain managable and no problems.  Pain Scale Score:  Refer to charted pain scores    THERAPY:    s/p spinal PF morphine yesterday.      MEDICATIONS  (STANDING):  acetaminophen   Tablet. 650 milliGRAM(s) Oral every 6 hours  aspirin enteric coated 325 milliGRAM(s) Oral two times a day  dextrose 5%. 1000 milliLiter(s) (50 mL/Hr) IV Continuous <Continuous>  dextrose 50% Injectable 12.5 Gram(s) IV Push once  dextrose 50% Injectable 25 Gram(s) IV Push once  dextrose 50% Injectable 25 Gram(s) IV Push once  docusate sodium 100 milliGRAM(s) Oral three times a day  gabapentin 100 milliGRAM(s) Oral every 8 hours  insulin lispro (HumaLOG) corrective regimen sliding scale   SubCutaneous three times a day before meals  insulin lispro (HumaLOG) corrective regimen sliding scale   SubCutaneous at bedtime  ketorolac   Injectable 15 milliGRAM(s) IV Push once  lactated ringers. 1000 milliLiter(s) (125 mL/Hr) IV Continuous <Continuous>  metoclopramide Injectable 5 milliGRAM(s) IV Push once  morphine PF Spinal 0.2 milliGRAM(s) IntraThecal. once  pantoprazole    Tablet 40 milliGRAM(s) Oral daily  polyethylene glycol 3350 17 Gram(s) Oral daily  traMADol 50 milliGRAM(s) Oral every 8 hours    MEDICATIONS  (PRN):  aluminum hydroxide/magnesium hydroxide/simethicone Suspension 30 milliLiter(s) Oral four times a day PRN Indigestion  dextrose Gel 1 Dose(s) Oral once PRN Blood Glucose LESS THAN 70 milliGRAM(s)/deciliter  glucagon  Injectable 1 milliGRAM(s) IntraMuscular once PRN Glucose LESS THAN 70 milligrams/deciliter  HYDROmorphone  Injectable 0.5 milliGRAM(s) IV Push every 4 hours PRN Severe Pain  HYDROmorphone  Injectable 0.5 milliGRAM(s) IV Push every 3 hours PRN Severe Pain  magnesium hydroxide Suspension 30 milliLiter(s) Oral daily PRN Constipation  naloxone Injectable 0.1 milliGRAM(s) IV Push every 3 minutes PRN For ANY of the following changes in patient status:  A. RR LESS THAN 10 breaths per minute, B. Oxygen saturation LESS THAN 90%, C. Sedation score of 6  ondansetron Injectable 4 milliGRAM(s) IV Push every 6 hours PRN Nausea and/or Vomiting  oxyCODONE    IR 5 milliGRAM(s) Oral every 4 hours PRN Mild Pain  oxyCODONE    IR 10 milliGRAM(s) Oral every 4 hours PRN Moderate Pain  senna 2 Tablet(s) Oral at bedtime PRN Constipation      OBJECTIVE:    Sedation Score:	[ x] Alert	[ ] Drowsy	[ ] Arousable	[ ] Asleep	[ ] Unresponsive    Side Effects:	[ x] None	[ ] Nausea	[ ] Vomiting	[ ] Pruritus  		  [ ] Weakness		[ ] Numbness	[ ] Other:    Vital Signs Last 24 Hrs  T(C): 36.8 (13 Jan 2018 06:03), Max: 37.1 (12 Jan 2018 10:29)  T(F): 98.3 (13 Jan 2018 06:03), Max: 98.7 (12 Jan 2018 10:29)  HR: 74 (13 Jan 2018 06:03) (64 - 89)  BP: 103/53 (13 Jan 2018 06:03) (89/61 - 122/63)  BP(mean): --  RR: 18 (13 Jan 2018 06:03) (12 - 19)  SpO2: 100% (13 Jan 2018 06:03) (96% - 100%)    ASSESSMENT/ PLAN  [x ] Patient transitioned to prn analgesics  [x] Pain management per primary service, pain service to sign off   [x]Documentation and Verification of current medications     Comments: PRN opioids or adjuvant medication to be given.

## 2018-01-13 NOTE — CONSULT NOTE ADULT - SUBJECTIVE AND OBJECTIVE BOX
HPI:     63 yo F w/ NIDDM and L knee OA admitted for TKR.    Pt has had severe R knee pain for the past 3 years, with xray showing severe OA.   Hga1c 5.8, indicating very well controlled DM. Pt states she does not take her Invokamet as prescribed - she only takes it three times a week because she feels her DM is well controlled w/ diet and exercise.   Currently, she is POD #1 since L TKR yesterday. She complains of severe L knee pain, 8/10, asking PT to come back later to work with her.  Denies cp/sob/abdominal pain/nausea/vomiting.       Home Meds:  Invokamet 150-1000 BID  Vimovo 500-20 BID  Aleve PRN     PAST MEDICAL & SURGICAL HISTORY:  Osteoarthritis  DM2 (diabetes mellitus, type 2)  History of ovarian cystectomy: left 1997, removed left ovary      Review of Systems:   CONSTITUTIONAL: No fever, weight loss, or fatigue  EYES: No eye pain, visual disturbances, or discharge  ENMT:  No difficulty hearing, tinnitus, vertigo; No sinus or throat pain  NECK: No pain or stiffness  RESPIRATORY: No cough, wheezing, chills or hemoptysis; No shortness of breath  CARDIOVASCULAR: No chest pain, palpitations, dizziness, or leg swelling  GASTROINTESTINAL: No abdominal or epigastric pain. No nausea, vomiting, or hematemesis; No diarrhea or constipation. No melena or hematochezia.  GENITOURINARY: No dysuria, frequency, hematuria, or incontinence  NEUROLOGICAL: No headaches, memory loss, loss of strength, numbness, or tremors  SKIN: No itching, burning, rashes, or lesions   LYMPH NODES: No enlarged glands  ENDOCRINE: No heat or cold intolerance; No hair loss  MUSCULOSKELETAL: L knee pain, s/p TKR   HEME/LYMPH: No easy bruising, or bleeding gums  ALLERY AND IMMUNOLOGIC: No hives or eczema    Allergies    No Known Allergies    Intolerances        Social History:     FAMILY HISTORY:  Family history of diabetes mellitus type II (Mother)      MEDICATIONS  (STANDING):  acetaminophen   Tablet. 650 milliGRAM(s) Oral every 6 hours  aspirin enteric coated 325 milliGRAM(s) Oral two times a day  dextrose 5%. 1000 milliLiter(s) (50 mL/Hr) IV Continuous <Continuous>  dextrose 50% Injectable 12.5 Gram(s) IV Push once  dextrose 50% Injectable 25 Gram(s) IV Push once  dextrose 50% Injectable 25 Gram(s) IV Push once  docusate sodium 100 milliGRAM(s) Oral three times a day  gabapentin 100 milliGRAM(s) Oral every 8 hours  insulin lispro (HumaLOG) corrective regimen sliding scale   SubCutaneous three times a day before meals  insulin lispro (HumaLOG) corrective regimen sliding scale   SubCutaneous at bedtime  ketorolac   Injectable 15 milliGRAM(s) IV Push once  lactated ringers. 1000 milliLiter(s) (125 mL/Hr) IV Continuous <Continuous>  metoclopramide Injectable 5 milliGRAM(s) IV Push once  morphine PF Spinal 0.2 milliGRAM(s) IntraThecal. once  pantoprazole    Tablet 40 milliGRAM(s) Oral daily  polyethylene glycol 3350 17 Gram(s) Oral daily  traMADol 50 milliGRAM(s) Oral every 8 hours    MEDICATIONS  (PRN):  aluminum hydroxide/magnesium hydroxide/simethicone Suspension 30 milliLiter(s) Oral four times a day PRN Indigestion  dextrose Gel 1 Dose(s) Oral once PRN Blood Glucose LESS THAN 70 milliGRAM(s)/deciliter  glucagon  Injectable 1 milliGRAM(s) IntraMuscular once PRN Glucose LESS THAN 70 milligrams/deciliter  HYDROmorphone  Injectable 0.5 milliGRAM(s) IV Push every 3 hours PRN Severe Pain  HYDROmorphone  Injectable 0.5 milliGRAM(s) IV Push every 3 hours PRN Severe Pain  magnesium hydroxide Suspension 30 milliLiter(s) Oral daily PRN Constipation  naloxone Injectable 0.1 milliGRAM(s) IV Push every 3 minutes PRN For ANY of the following changes in patient status:  A. RR LESS THAN 10 breaths per minute, B. Oxygen saturation LESS THAN 90%, C. Sedation score of 6  ondansetron Injectable 4 milliGRAM(s) IV Push every 6 hours PRN Nausea and/or Vomiting  oxyCODONE    IR 5 milliGRAM(s) Oral every 3 hours PRN Mild Pain  oxyCODONE    IR 10 milliGRAM(s) Oral every 3 hours PRN Moderate Pain  senna 2 Tablet(s) Oral at bedtime PRN Constipation      Vital Signs Last 24 Hrs  T(C): 37.1 (13 Jan 2018 10:15), Max: 37.1 (13 Jan 2018 10:15)  T(F): 98.7 (13 Jan 2018 10:15), Max: 98.7 (13 Jan 2018 10:15)  HR: 73 (13 Jan 2018 10:15) (64 - 89)  BP: 115/54 (13 Jan 2018 10:15) (89/61 - 122/63)  BP(mean): --  RR: 17 (13 Jan 2018 10:15) (12 - 19)  SpO2: 97% (13 Jan 2018 10:15) (96% - 100%)  CAPILLARY BLOOD GLUCOSE      POCT Blood Glucose.: 102 mg/dL (13 Jan 2018 12:06)  POCT Blood Glucose.: 106 mg/dL (13 Jan 2018 08:40)  POCT Blood Glucose.: 133 mg/dL (12 Jan 2018 22:07)  POCT Blood Glucose.: 98 mg/dL (12 Jan 2018 17:25)        PHYSICAL EXAM:  GENERAL: NAD, well-developed  HEAD:  Atraumatic, Normocephalic  EYES: EOMI, PERRLA, conjunctiva and sclera clear  NECK: Supple, No JVD  CHEST/LUNG: Clear to auscultation bilaterally; No wheeze  HEART: Regular rate and rhythm; No murmurs, rubs, or gallops  ABDOMEN: Soft, Nontender, Nondistended; Bowel sounds present  EXTREMITIES:  L knee dressing c/d/i   PSYCH: AAOx3  NEUROLOGY: non-focal  SKIN: No rashes or lesions    LABS:                        10.5   10.07 )-----------( 290      ( 13 Jan 2018 05:30 )             32.1     01-13    142  |  104  |  9   ----------------------------<  120<H>  4.5   |  26  |  0.57    Ca    8.6      13 Jan 2018 05:30                EKG(personally reviewed):    RADIOLOGY & ADDITIONAL TESTS:    Imaging Personally Reviewed:    Consultant(s) Notes Reviewed:      Care Discussed with Consultants/Other Providers: HPI:     63 yo F w/ NIDDM and L knee OA admitted for TKR.    Pt has had severe R knee pain for the past 3 years, with xray showing severe OA.   Hga1c 5.8, indicating very well controlled DM. Pt states she does not take her Invokamet as prescribed - she only takes it three times a week because she feels her DM is well controlled w/ diet and exercise.   Currently, she is POD #1 since L TKR yesterday. She complains of severe L knee pain, 8/10, asking PT to come back later to work with her.  Denies cp/sob/abdominal pain/nausea/vomiting.       Home Meds:  Invokamet 150-1000 BID  Vimovo 500-20 BID  Aleve PRN     PAST MEDICAL & SURGICAL HISTORY:  Osteoarthritis  DM2 (diabetes mellitus, type 2)  History of ovarian cystectomy: left 1997, removed left ovary      Review of Systems:   CONSTITUTIONAL: No fever, weight loss, or fatigue  EYES: No eye pain, visual disturbances, or discharge  ENMT:  No difficulty hearing, tinnitus, vertigo; No sinus or throat pain  NECK: No pain or stiffness  RESPIRATORY: No cough, wheezing, chills or hemoptysis; No shortness of breath  CARDIOVASCULAR: No chest pain, palpitations, dizziness, or leg swelling  GASTROINTESTINAL: No abdominal or epigastric pain. No nausea, vomiting, or hematemesis; No diarrhea or constipation. No melena or hematochezia.  GENITOURINARY: No dysuria, frequency, hematuria, or incontinence  NEUROLOGICAL: No headaches, memory loss, loss of strength, numbness, or tremors  SKIN: No itching, burning, rashes, or lesions   LYMPH NODES: No enlarged glands  ENDOCRINE: No heat or cold intolerance; No hair loss  MUSCULOSKELETAL: L knee pain, s/p TKR   HEME/LYMPH: No easy bruising, or bleeding gums  ALLERY AND IMMUNOLOGIC: No hives or eczema    Allergies    No Known Allergies    Intolerances        Social History:   single  lives with 2 children  no substance abuse      FAMILY HISTORY:  Family history of diabetes mellitus type II (Mother)      MEDICATIONS  (STANDING):  acetaminophen   Tablet. 650 milliGRAM(s) Oral every 6 hours  aspirin enteric coated 325 milliGRAM(s) Oral two times a day  dextrose 5%. 1000 milliLiter(s) (50 mL/Hr) IV Continuous <Continuous>  dextrose 50% Injectable 12.5 Gram(s) IV Push once  dextrose 50% Injectable 25 Gram(s) IV Push once  dextrose 50% Injectable 25 Gram(s) IV Push once  docusate sodium 100 milliGRAM(s) Oral three times a day  gabapentin 100 milliGRAM(s) Oral every 8 hours  insulin lispro (HumaLOG) corrective regimen sliding scale   SubCutaneous three times a day before meals  insulin lispro (HumaLOG) corrective regimen sliding scale   SubCutaneous at bedtime  ketorolac   Injectable 15 milliGRAM(s) IV Push once  lactated ringers. 1000 milliLiter(s) (125 mL/Hr) IV Continuous <Continuous>  metoclopramide Injectable 5 milliGRAM(s) IV Push once  morphine PF Spinal 0.2 milliGRAM(s) IntraThecal. once  pantoprazole    Tablet 40 milliGRAM(s) Oral daily  polyethylene glycol 3350 17 Gram(s) Oral daily  traMADol 50 milliGRAM(s) Oral every 8 hours    MEDICATIONS  (PRN):  aluminum hydroxide/magnesium hydroxide/simethicone Suspension 30 milliLiter(s) Oral four times a day PRN Indigestion  dextrose Gel 1 Dose(s) Oral once PRN Blood Glucose LESS THAN 70 milliGRAM(s)/deciliter  glucagon  Injectable 1 milliGRAM(s) IntraMuscular once PRN Glucose LESS THAN 70 milligrams/deciliter  HYDROmorphone  Injectable 0.5 milliGRAM(s) IV Push every 3 hours PRN Severe Pain  HYDROmorphone  Injectable 0.5 milliGRAM(s) IV Push every 3 hours PRN Severe Pain  magnesium hydroxide Suspension 30 milliLiter(s) Oral daily PRN Constipation  naloxone Injectable 0.1 milliGRAM(s) IV Push every 3 minutes PRN For ANY of the following changes in patient status:  A. RR LESS THAN 10 breaths per minute, B. Oxygen saturation LESS THAN 90%, C. Sedation score of 6  ondansetron Injectable 4 milliGRAM(s) IV Push every 6 hours PRN Nausea and/or Vomiting  oxyCODONE    IR 5 milliGRAM(s) Oral every 3 hours PRN Mild Pain  oxyCODONE    IR 10 milliGRAM(s) Oral every 3 hours PRN Moderate Pain  senna 2 Tablet(s) Oral at bedtime PRN Constipation      Vital Signs Last 24 Hrs  T(C): 37.1 (13 Jan 2018 10:15), Max: 37.1 (13 Jan 2018 10:15)  T(F): 98.7 (13 Jan 2018 10:15), Max: 98.7 (13 Jan 2018 10:15)  HR: 73 (13 Jan 2018 10:15) (64 - 89)  BP: 115/54 (13 Jan 2018 10:15) (89/61 - 122/63)  BP(mean): --  RR: 17 (13 Jan 2018 10:15) (12 - 19)  SpO2: 97% (13 Jan 2018 10:15) (96% - 100%)  CAPILLARY BLOOD GLUCOSE      POCT Blood Glucose.: 102 mg/dL (13 Jan 2018 12:06)  POCT Blood Glucose.: 106 mg/dL (13 Jan 2018 08:40)  POCT Blood Glucose.: 133 mg/dL (12 Jan 2018 22:07)  POCT Blood Glucose.: 98 mg/dL (12 Jan 2018 17:25)        PHYSICAL EXAM:  GENERAL: NAD, well-developed  HEAD:  Atraumatic, Normocephalic  EYES: EOMI, PERRLA, conjunctiva and sclera clear  NECK: Supple, No JVD  CHEST/LUNG: Clear to auscultation bilaterally; No wheeze  HEART: Regular rate and rhythm; No murmurs, rubs, or gallops  ABDOMEN: Soft, Nontender, Nondistended; Bowel sounds present  EXTREMITIES:  L knee dressing c/d/i   PSYCH: AAOx3  NEUROLOGY: non-focal  SKIN: No rashes or lesions    LABS:                        10.5   10.07 )-----------( 290      ( 13 Jan 2018 05:30 )             32.1     01-13    142  |  104  |  9   ----------------------------<  120<H>  4.5   |  26  |  0.57    Ca    8.6      13 Jan 2018 05:30                EKG(personally reviewed):    RADIOLOGY & ADDITIONAL TESTS:    Imaging Personally Reviewed:    Consultant(s) Notes Reviewed:      Care Discussed with Consultants/Other Providers:

## 2018-01-13 NOTE — PROGRESS NOTE ADULT - SUBJECTIVE AND OBJECTIVE BOX
POD #1 Left TKR    Patient c/o left knee pain.  Vital Signs Last 24 Hrs  T(C): 36.8 (13 Jan 2018 06:03), Max: 37.1 (12 Jan 2018 10:29)  T(F): 98.3 (13 Jan 2018 06:03), Max: 98.7 (12 Jan 2018 10:29)  HR: 74 (13 Jan 2018 06:03) (64 - 89)  BP: 103/53 (13 Jan 2018 06:03) (89/61 - 122/63)  BP(mean): --  RR: 18 (13 Jan 2018 06:03) (12 - 19)  SpO2: 100% (13 Jan 2018 06:03) (96% - 100%)    Left knee dressing intact. Calves non tender.  NV intact.                        10.5   10.07 )-----------( 290      ( 13 Jan 2018 05:30 )             32.1     OOB with PT  DVT prophylaxis

## 2018-01-14 LAB
GLUCOSE BLDC GLUCOMTR-MCNC: 109 MG/DL — HIGH (ref 70–99)
GLUCOSE BLDC GLUCOMTR-MCNC: 139 MG/DL — HIGH (ref 70–99)
GLUCOSE BLDC GLUCOMTR-MCNC: 90 MG/DL — SIGNIFICANT CHANGE UP (ref 70–99)
GLUCOSE BLDC GLUCOMTR-MCNC: 97 MG/DL — SIGNIFICANT CHANGE UP (ref 70–99)

## 2018-01-14 PROCEDURE — 99232 SBSQ HOSP IP/OBS MODERATE 35: CPT

## 2018-01-14 RX ORDER — OXYCODONE HYDROCHLORIDE 5 MG/1
10 TABLET ORAL
Qty: 0 | Refills: 0 | Status: DISCONTINUED | OUTPATIENT
Start: 2018-01-14 | End: 2018-01-17

## 2018-01-14 RX ORDER — SODIUM CHLORIDE 9 MG/ML
1000 INJECTION INTRAMUSCULAR; INTRAVENOUS; SUBCUTANEOUS
Qty: 0 | Refills: 0 | Status: DISCONTINUED | OUTPATIENT
Start: 2018-01-14 | End: 2018-01-16

## 2018-01-14 RX ORDER — OXYCODONE HYDROCHLORIDE 5 MG/1
15 TABLET ORAL
Qty: 0 | Refills: 0 | Status: DISCONTINUED | OUTPATIENT
Start: 2018-01-14 | End: 2018-01-17

## 2018-01-14 RX ADMIN — OXYCODONE HYDROCHLORIDE 10 MILLIGRAM(S): 5 TABLET ORAL at 00:26

## 2018-01-14 RX ADMIN — HYDROMORPHONE HYDROCHLORIDE 0.5 MILLIGRAM(S): 2 INJECTION INTRAMUSCULAR; INTRAVENOUS; SUBCUTANEOUS at 13:11

## 2018-01-14 RX ADMIN — TRAMADOL HYDROCHLORIDE 50 MILLIGRAM(S): 50 TABLET ORAL at 21:19

## 2018-01-14 RX ADMIN — Medication 650 MILLIGRAM(S): at 18:29

## 2018-01-14 RX ADMIN — CELECOXIB 200 MILLIGRAM(S): 200 CAPSULE ORAL at 07:42

## 2018-01-14 RX ADMIN — Medication 325 MILLIGRAM(S): at 05:39

## 2018-01-14 RX ADMIN — SODIUM CHLORIDE 100 MILLILITER(S): 9 INJECTION INTRAMUSCULAR; INTRAVENOUS; SUBCUTANEOUS at 11:53

## 2018-01-14 RX ADMIN — Medication 650 MILLIGRAM(S): at 23:52

## 2018-01-14 RX ADMIN — OXYCODONE HYDROCHLORIDE 15 MILLIGRAM(S): 5 TABLET ORAL at 23:52

## 2018-01-14 RX ADMIN — Medication 650 MILLIGRAM(S): at 05:39

## 2018-01-14 RX ADMIN — Medication 325 MILLIGRAM(S): at 18:29

## 2018-01-14 RX ADMIN — TRAMADOL HYDROCHLORIDE 50 MILLIGRAM(S): 50 TABLET ORAL at 22:00

## 2018-01-14 RX ADMIN — Medication 100 MILLIGRAM(S): at 05:39

## 2018-01-14 RX ADMIN — GABAPENTIN 100 MILLIGRAM(S): 400 CAPSULE ORAL at 21:20

## 2018-01-14 RX ADMIN — OXYCODONE HYDROCHLORIDE 15 MILLIGRAM(S): 5 TABLET ORAL at 11:20

## 2018-01-14 RX ADMIN — Medication 15 MILLIGRAM(S): at 00:26

## 2018-01-14 RX ADMIN — Medication 100 MILLIGRAM(S): at 21:20

## 2018-01-14 RX ADMIN — GABAPENTIN 100 MILLIGRAM(S): 400 CAPSULE ORAL at 15:36

## 2018-01-14 RX ADMIN — POLYETHYLENE GLYCOL 3350 17 GRAM(S): 17 POWDER, FOR SOLUTION ORAL at 11:53

## 2018-01-14 RX ADMIN — TRAMADOL HYDROCHLORIDE 50 MILLIGRAM(S): 50 TABLET ORAL at 15:35

## 2018-01-14 RX ADMIN — Medication 650 MILLIGRAM(S): at 00:26

## 2018-01-14 RX ADMIN — Medication 650 MILLIGRAM(S): at 11:53

## 2018-01-14 RX ADMIN — TRAMADOL HYDROCHLORIDE 50 MILLIGRAM(S): 50 TABLET ORAL at 05:39

## 2018-01-14 RX ADMIN — GABAPENTIN 100 MILLIGRAM(S): 400 CAPSULE ORAL at 05:39

## 2018-01-14 RX ADMIN — OXYCODONE HYDROCHLORIDE 15 MILLIGRAM(S): 5 TABLET ORAL at 06:15

## 2018-01-14 RX ADMIN — OXYCODONE HYDROCHLORIDE 10 MILLIGRAM(S): 5 TABLET ORAL at 01:00

## 2018-01-14 RX ADMIN — HYDROMORPHONE HYDROCHLORIDE 0.5 MILLIGRAM(S): 2 INJECTION INTRAMUSCULAR; INTRAVENOUS; SUBCUTANEOUS at 13:20

## 2018-01-14 RX ADMIN — Medication 100 MILLIGRAM(S): at 15:36

## 2018-01-14 RX ADMIN — OXYCODONE HYDROCHLORIDE 15 MILLIGRAM(S): 5 TABLET ORAL at 05:39

## 2018-01-14 RX ADMIN — OXYCODONE HYDROCHLORIDE 15 MILLIGRAM(S): 5 TABLET ORAL at 10:23

## 2018-01-14 RX ADMIN — PANTOPRAZOLE SODIUM 40 MILLIGRAM(S): 20 TABLET, DELAYED RELEASE ORAL at 11:53

## 2018-01-14 RX ADMIN — OXYCODONE HYDROCHLORIDE 15 MILLIGRAM(S): 5 TABLET ORAL at 18:29

## 2018-01-14 NOTE — PROGRESS NOTE ADULT - PROBLEM SELECTOR PLAN 1
-s/p L TKR on 1/12  -still with significant pain - on regimen of oxyIR PRN for moderate and dilaudid PRN severe pain, also on tramadol TID.

## 2018-01-14 NOTE — PROGRESS NOTE ADULT - SUBJECTIVE AND OBJECTIVE BOX
Patient seen and examined. Pain controlled. Some N/V overnight, No other acute events overnight. Patient will walked with PT.      MEDICATIONS  (STANDING):  acetaminophen   Tablet. 650 milliGRAM(s) Oral every 6 hours  aspirin enteric coated 325 milliGRAM(s) Oral two times a day  ceFAZolin   IVPB 2000 milliGRAM(s) IV Intermittent every 8 hours  dextrose 5%. 1000 milliLiter(s) IV Continuous <Continuous>  dextrose 50% Injectable 12.5 Gram(s) IV Push once  dextrose 50% Injectable 25 Gram(s) IV Push once  dextrose 50% Injectable 25 Gram(s) IV Push once  docusate sodium 100 milliGRAM(s) Oral three times a day  gabapentin 100 milliGRAM(s) Oral every 8 hours  insulin lispro (HumaLOG) corrective regimen sliding scale   SubCutaneous three times a day before meals  insulin lispro (HumaLOG) corrective regimen sliding scale   SubCutaneous at bedtime  ketorolac   Injectable 15 milliGRAM(s) IV Push once  lactated ringers. 1000 milliLiter(s) IV Continuous <Continuous>  metoclopramide Injectable 5 milliGRAM(s) IV Push once  morphine PF Spinal 0.2 milliGRAM(s) IntraThecal. once  pantoprazole    Tablet 40 milliGRAM(s) Oral daily  polyethylene glycol 3350 17 Gram(s) Oral daily  sodium chloride 0.9% Bolus 1000 milliLiter(s) IV Bolus once  traMADol 50 milliGRAM(s) Oral every 8 hours    Allergies    No Known Allergies    Intolerances                 Vital Signs Last 24 Hrs  T(C): 36.9 (13 Jan 2018 21:22), Max: 37.2 (13 Jan 2018 14:51)  T(F): 98.5 (13 Jan 2018 21:22), Max: 98.9 (13 Jan 2018 14:51)  HR: 94 (13 Jan 2018 21:22) (64 - 94)  BP: 125/57 (13 Jan 2018 21:22) (103/53 - 125/57)  BP(mean): --  RR: 16 (13 Jan 2018 21:22) (16 - 18)  SpO2: 98% (13 Jan 2018 21:22) (97% - 100%)    Physical Exam  Gen: NAD  LLE:   Dressing c/d/i    +ehl/fhl/ta/gs function  L2-S1 silt  Dp/pt pulse intact  No calf ttp  Compartments soft    A/P: 62y Female sp L TKA POD 2  Pain control  DVT ppx, asa bid  PT/WBAT/OOB  FU labs  Ice/elevate  Medical management appreciated  Incentive spirometry  Dispo planning

## 2018-01-14 NOTE — PROGRESS NOTE ADULT - SUBJECTIVE AND OBJECTIVE BOX
POD #2 Left TKR    She c/o pain in knee.  Vital Signs Last 24 Hrs  T(C): 36.9 (14 Jan 2018 05:37), Max: 37.4 (14 Jan 2018 01:44)  T(F): 98.5 (14 Jan 2018 05:37), Max: 99.4 (14 Jan 2018 01:44)  HR: 80 (14 Jan 2018 05:37) (73 - 97)  BP: 106/50 (14 Jan 2018 05:37) (106/50 - 132/54)  BP(mean): --  RR: 16 (14 Jan 2018 05:37) (16 - 18)  SpO2: 97% (14 Jan 2018 05:37) (97% - 99%)    Left knee dressing intact. Calves soft  NV intact. SLR difficult                          10.5   10.07 )-----------( 290      ( 13 Jan 2018 05:30 )             32.1     Continue PT  Pain management

## 2018-01-14 NOTE — PROGRESS NOTE ADULT - SUBJECTIVE AND OBJECTIVE BOX
Patient is a 62y old  Female who presents with a chief complaint of "left knee replacement" (26 Dec 2017 07:11)        SUBJECTIVE / OVERNIGHT EVENTS:  no acute events o/n  afebrile  states L knee pain is a bit improved, 6/10.   denies cp/sob/abdominal pain     MEDICATIONS  (STANDING):  acetaminophen   Tablet. 650 milliGRAM(s) Oral every 6 hours  aspirin enteric coated 325 milliGRAM(s) Oral two times a day  celecoxib 200 milliGRAM(s) Oral with breakfast  dextrose 5%. 1000 milliLiter(s) (50 mL/Hr) IV Continuous <Continuous>  dextrose 50% Injectable 12.5 Gram(s) IV Push once  dextrose 50% Injectable 25 Gram(s) IV Push once  dextrose 50% Injectable 25 Gram(s) IV Push once  docusate sodium 100 milliGRAM(s) Oral three times a day  gabapentin 100 milliGRAM(s) Oral every 8 hours  insulin lispro (HumaLOG) corrective regimen sliding scale   SubCutaneous three times a day before meals  insulin lispro (HumaLOG) corrective regimen sliding scale   SubCutaneous at bedtime  metoclopramide Injectable 5 milliGRAM(s) IV Push once  morphine PF Spinal 0.2 milliGRAM(s) IntraThecal. once  pantoprazole    Tablet 40 milliGRAM(s) Oral daily  polyethylene glycol 3350 17 Gram(s) Oral daily  sodium chloride 0.9%. 1000 milliLiter(s) (100 mL/Hr) IV Continuous <Continuous>  traMADol 50 milliGRAM(s) Oral every 8 hours    MEDICATIONS  (PRN):  aluminum hydroxide/magnesium hydroxide/simethicone Suspension 30 milliLiter(s) Oral four times a day PRN Indigestion  bisacodyl Suppository 10 milliGRAM(s) Rectal daily PRN If no bowel movement by postoperative day #2  dextrose Gel 1 Dose(s) Oral once PRN Blood Glucose LESS THAN 70 milliGRAM(s)/deciliter  glucagon  Injectable 1 milliGRAM(s) IntraMuscular once PRN Glucose LESS THAN 70 milligrams/deciliter  HYDROmorphone  Injectable 0.5 milliGRAM(s) IV Push every 3 hours PRN Severe Pain  HYDROmorphone  Injectable 0.5 milliGRAM(s) IV Push every 3 hours PRN breakthrough pain  HYDROmorphone  Injectable 0.5 milliGRAM(s) IV Push every 3 hours PRN Severe Pain  magnesium hydroxide Suspension 30 milliLiter(s) Oral daily PRN Constipation  naloxone Injectable 0.1 milliGRAM(s) IV Push every 3 minutes PRN For ANY of the following changes in patient status:  A. RR LESS THAN 10 breaths per minute, B. Oxygen saturation LESS THAN 90%, C. Sedation score of 6  ondansetron Injectable 4 milliGRAM(s) IV Push every 6 hours PRN Nausea and/or Vomiting  oxyCODONE    IR 10 milliGRAM(s) Oral every 3 hours PRN Mild Pain  oxyCODONE    IR 15 milliGRAM(s) Oral every 3 hours PRN Moderate Pain  senna 2 Tablet(s) Oral at bedtime PRN Constipation      Vital Signs Last 24 Hrs  T(C): 36.8 (14 Jan 2018 09:23), Max: 37.4 (14 Jan 2018 01:44)  T(F): 98.2 (14 Jan 2018 09:23), Max: 99.4 (14 Jan 2018 01:44)  HR: 67 (14 Jan 2018 10:24) (67 - 97)  BP: 94/48 (14 Jan 2018 10:24) (94/48 - 132/54)  BP(mean): --  RR: 16 (14 Jan 2018 10:24) (16 - 18)  SpO2: 100% (14 Jan 2018 10:24) (97% - 100%)  CAPILLARY BLOOD GLUCOSE      POCT Blood Glucose.: 90 mg/dL (14 Jan 2018 08:22)  POCT Blood Glucose.: 105 mg/dL (13 Jan 2018 22:47)  POCT Blood Glucose.: 110 mg/dL (13 Jan 2018 17:19)  POCT Blood Glucose.: 102 mg/dL (13 Jan 2018 12:06)    I&O's Summary    13 Jan 2018 07:01  -  14 Jan 2018 07:00  --------------------------------------------------------  IN: 0 mL / OUT: 600 mL / NET: -600 mL      PHYSICAL EXAM:  GENERAL: NAD, well-developed  HEAD:  Atraumatic, Normocephalic  EYES: EOMI, PERRLA, conjunctiva and sclera clear  NECK: Supple, No JVD  CHEST/LUNG: Clear to auscultation bilaterally; No wheeze  HEART: Regular rate and rhythm; No murmurs, rubs, or gallops  ABDOMEN: Soft, Nontender, Nondistended; Bowel sounds present  EXTREMITIES:  L knee dressing c/d/i   PSYCH: AAOx3  NEUROLOGY: non-focal  SKIN: No rashes or lesions          LABS:                        10.5   10.07 )-----------( 290      ( 13 Jan 2018 05:30 )             32.1     01-13    142  |  104  |  9   ----------------------------<  120<H>  4.5   |  26  |  0.57    Ca    8.6      13 Jan 2018 05:30                RADIOLOGY & ADDITIONAL TESTS:    Imaging Personally Reviewed:  Consultant(s) Notes Reviewed:    Care Discussed with Consultants/Other Providers:

## 2018-01-15 LAB
GLUCOSE BLDC GLUCOMTR-MCNC: 104 MG/DL — HIGH (ref 70–99)
GLUCOSE BLDC GLUCOMTR-MCNC: 121 MG/DL — HIGH (ref 70–99)
GLUCOSE BLDC GLUCOMTR-MCNC: 136 MG/DL — HIGH (ref 70–99)
GLUCOSE BLDC GLUCOMTR-MCNC: 92 MG/DL — SIGNIFICANT CHANGE UP (ref 70–99)

## 2018-01-15 PROCEDURE — 99232 SBSQ HOSP IP/OBS MODERATE 35: CPT

## 2018-01-15 RX ADMIN — OXYCODONE HYDROCHLORIDE 15 MILLIGRAM(S): 5 TABLET ORAL at 00:53

## 2018-01-15 RX ADMIN — GABAPENTIN 100 MILLIGRAM(S): 400 CAPSULE ORAL at 14:08

## 2018-01-15 RX ADMIN — Medication 100 MILLIGRAM(S): at 05:41

## 2018-01-15 RX ADMIN — TRAMADOL HYDROCHLORIDE 50 MILLIGRAM(S): 50 TABLET ORAL at 21:24

## 2018-01-15 RX ADMIN — OXYCODONE HYDROCHLORIDE 15 MILLIGRAM(S): 5 TABLET ORAL at 09:15

## 2018-01-15 RX ADMIN — TRAMADOL HYDROCHLORIDE 50 MILLIGRAM(S): 50 TABLET ORAL at 06:41

## 2018-01-15 RX ADMIN — GABAPENTIN 100 MILLIGRAM(S): 400 CAPSULE ORAL at 05:41

## 2018-01-15 RX ADMIN — TRAMADOL HYDROCHLORIDE 50 MILLIGRAM(S): 50 TABLET ORAL at 14:08

## 2018-01-15 RX ADMIN — Medication 650 MILLIGRAM(S): at 05:41

## 2018-01-15 RX ADMIN — PANTOPRAZOLE SODIUM 40 MILLIGRAM(S): 20 TABLET, DELAYED RELEASE ORAL at 12:44

## 2018-01-15 RX ADMIN — OXYCODONE HYDROCHLORIDE 15 MILLIGRAM(S): 5 TABLET ORAL at 18:35

## 2018-01-15 RX ADMIN — Medication 325 MILLIGRAM(S): at 05:41

## 2018-01-15 RX ADMIN — Medication 325 MILLIGRAM(S): at 18:38

## 2018-01-15 RX ADMIN — Medication 100 MILLIGRAM(S): at 21:24

## 2018-01-15 RX ADMIN — GABAPENTIN 100 MILLIGRAM(S): 400 CAPSULE ORAL at 21:24

## 2018-01-15 RX ADMIN — TRAMADOL HYDROCHLORIDE 50 MILLIGRAM(S): 50 TABLET ORAL at 05:41

## 2018-01-15 RX ADMIN — OXYCODONE HYDROCHLORIDE 15 MILLIGRAM(S): 5 TABLET ORAL at 10:15

## 2018-01-15 RX ADMIN — Medication 100 MILLIGRAM(S): at 14:08

## 2018-01-15 RX ADMIN — Medication 650 MILLIGRAM(S): at 06:41

## 2018-01-15 RX ADMIN — SODIUM CHLORIDE 100 MILLILITER(S): 9 INJECTION INTRAMUSCULAR; INTRAVENOUS; SUBCUTANEOUS at 00:53

## 2018-01-15 RX ADMIN — CELECOXIB 200 MILLIGRAM(S): 200 CAPSULE ORAL at 09:15

## 2018-01-15 RX ADMIN — POLYETHYLENE GLYCOL 3350 17 GRAM(S): 17 POWDER, FOR SOLUTION ORAL at 12:44

## 2018-01-15 RX ADMIN — OXYCODONE HYDROCHLORIDE 15 MILLIGRAM(S): 5 TABLET ORAL at 19:35

## 2018-01-15 RX ADMIN — Medication 650 MILLIGRAM(S): at 12:44

## 2018-01-15 RX ADMIN — OXYCODONE HYDROCHLORIDE 15 MILLIGRAM(S): 5 TABLET ORAL at 21:55

## 2018-01-15 RX ADMIN — OXYCODONE HYDROCHLORIDE 15 MILLIGRAM(S): 5 TABLET ORAL at 22:25

## 2018-01-15 RX ADMIN — Medication 650 MILLIGRAM(S): at 00:52

## 2018-01-15 NOTE — OCCUPATIONAL THERAPY INITIAL EVALUATION ADULT - PERTINENT HX OF CURRENT PROBLEM, REHAB EVAL
Pt is a 62 year old female with PMHx of DM2 and osteoarthritis who reports she has experienced knee pain for 3 years, worsening over past year.  Pt reports Xrays done, revealed severe osteoarthritis.  Pt is now s/p left total knee replacement on 1/12/18.

## 2018-01-15 NOTE — PROGRESS NOTE ADULT - SUBJECTIVE AND OBJECTIVE BOX
POD #3 Left TKR    Patient c/o pain    Vital Signs Last 24 Hrs  T(C): 37.1 (15 Russel 2018 05:26), Max: 37.4 (15 Russel 2018 02:03)  T(F): 98.8 (15 Russel 2018 05:26), Max: 99.3 (15 Russel 2018 02:03)  HR: 88 (15 Russel 2018 05:26) (67 - 95)  BP: 104/56 (15 Russel 2018 05:26) (94/45 - 112/50)  BP(mean): --  RR: 18 (15 Russel 2018 05:26) (16 - 18)  SpO2: 99% (15 Russel 2018 05:26) (97% - 100%)    Left knee dressing intact. Calf soft  NV intact  Hesitant to bend her knee for fear of pain    Patient advised that this is the best time to regain her strength and ROM. With time it will become more difficult and painful to bend her knee.  Discharge planning

## 2018-01-15 NOTE — OCCUPATIONAL THERAPY INITIAL EVALUATION ADULT - LIVES WITH, PROFILE
Pt. reports she lives with her daughter in a house with 3 steps to enter. Once inside, pt. reports she has full flight of steps to negotiate to reach the 2nd floor where main bedroom and bathroom are located. Per pt., she has a bathtub in her bathroom.

## 2018-01-15 NOTE — PROGRESS NOTE ADULT - PROBLEM SELECTOR PLAN 1
-s/p L TKR on 1/12, POD #3 today   -pain improving - on regimen of oxyIR PRN for moderate and dilaudid PRN severe pain, also on tramadol TID. has not required Dilaudid IV in last day.

## 2018-01-15 NOTE — PROGRESS NOTE ADULT - SUBJECTIVE AND OBJECTIVE BOX
Orthopedic PA Progress Note    Patient seen and examined at bedside, no acute events overnight.  Denies any CP, SOB, N.V.D, HA or dizziness.       MEDICATIONS  (STANDING):  acetaminophen   Tablet. 650 milliGRAM(s) Oral every 6 hours  aspirin enteric coated 325 milliGRAM(s) Oral two times a day  ceFAZolin   IVPB 2000 milliGRAM(s) IV Intermittent every 8 hours  dextrose 5%. 1000 milliLiter(s) IV Continuous <Continuous>  dextrose 50% Injectable 12.5 Gram(s) IV Push once  dextrose 50% Injectable 25 Gram(s) IV Push once  dextrose 50% Injectable 25 Gram(s) IV Push once  docusate sodium 100 milliGRAM(s) Oral three times a day  gabapentin 100 milliGRAM(s) Oral every 8 hours  insulin lispro (HumaLOG) corrective regimen sliding scale   SubCutaneous three times a day before meals  insulin lispro (HumaLOG) corrective regimen sliding scale   SubCutaneous at bedtime  ketorolac   Injectable 15 milliGRAM(s) IV Push once  lactated ringers. 1000 milliLiter(s) IV Continuous <Continuous>  metoclopramide Injectable 5 milliGRAM(s) IV Push once  morphine PF Spinal 0.2 milliGRAM(s) IntraThecal. once  pantoprazole    Tablet 40 milliGRAM(s) Oral daily  polyethylene glycol 3350 17 Gram(s) Oral daily  sodium chloride 0.9% Bolus 1000 milliLiter(s) IV Bolus once  traMADol 50 milliGRAM(s) Oral every 8 hours    Vital Signs Last 24 Hrs  T(C): 37.1 (15 Russel 2018 05:26), Max: 37.4 (15 Russel 2018 02:03)  T(F): 98.8 (15 Russel 2018 05:26), Max: 99.3 (15 Russel 2018 02:03)  HR: 88 (15 Russel 2018 05:26) (67 - 95)  BP: 104/56 (15 Russel 2018 05:26) (94/45 - 112/50)  BP(mean): --  RR: 18 (15 Russel 2018 05:26) (16 - 18)  SpO2: 99% (15 Russel 2018 05:26) (97% - 100%)    Physical Exam  Gen: NAD  LLE:   Dressing c/d/i  +ehl/fhl/ta/gs intact bilaterally  L2-S1 silt bilaterally  Dp/pt pulse intact  No calf ttp  Compartments soft    A/P: 62y Female sp L TKA POD 3  Pain control  DVT ppx, asa bid  PT/WBAT/OOB  Ice/elevate  Incentive spirometry  Dispo planning home vs rehab today

## 2018-01-15 NOTE — OCCUPATIONAL THERAPY INITIAL EVALUATION ADULT - MD ORDER
Occupational Therapy (OT) to evaluate and treat. Occupational Therapy (OT) to evaluate and treat. Out of bed to Chair. Per MICHAEL Byrnes, pt is okay to participate in OT evaluation and perform activity as tolerated.

## 2018-01-15 NOTE — PROGRESS NOTE ADULT - SUBJECTIVE AND OBJECTIVE BOX
Patient is a 62y old  Female who presents with a chief complaint of "left knee replacement" (26 Dec 2017 07:11)        SUBJECTIVE / OVERNIGHT EVENTS:  no acute events o/n  pain improving, now rated 4/10  looking forward to going to rehab      MEDICATIONS  (STANDING):  acetaminophen   Tablet. 650 milliGRAM(s) Oral every 6 hours  aspirin enteric coated 325 milliGRAM(s) Oral two times a day  celecoxib 200 milliGRAM(s) Oral with breakfast  dextrose 5%. 1000 milliLiter(s) (50 mL/Hr) IV Continuous <Continuous>  dextrose 50% Injectable 12.5 Gram(s) IV Push once  dextrose 50% Injectable 25 Gram(s) IV Push once  dextrose 50% Injectable 25 Gram(s) IV Push once  docusate sodium 100 milliGRAM(s) Oral three times a day  gabapentin 100 milliGRAM(s) Oral every 8 hours  insulin lispro (HumaLOG) corrective regimen sliding scale   SubCutaneous three times a day before meals  insulin lispro (HumaLOG) corrective regimen sliding scale   SubCutaneous at bedtime  metoclopramide Injectable 5 milliGRAM(s) IV Push once  morphine PF Spinal 0.2 milliGRAM(s) IntraThecal. once  pantoprazole    Tablet 40 milliGRAM(s) Oral daily  polyethylene glycol 3350 17 Gram(s) Oral daily  sodium chloride 0.9%. 1000 milliLiter(s) (100 mL/Hr) IV Continuous <Continuous>  traMADol 50 milliGRAM(s) Oral every 8 hours    MEDICATIONS  (PRN):  aluminum hydroxide/magnesium hydroxide/simethicone Suspension 30 milliLiter(s) Oral four times a day PRN Indigestion  bisacodyl Suppository 10 milliGRAM(s) Rectal daily PRN If no bowel movement by postoperative day #2  dextrose Gel 1 Dose(s) Oral once PRN Blood Glucose LESS THAN 70 milliGRAM(s)/deciliter  glucagon  Injectable 1 milliGRAM(s) IntraMuscular once PRN Glucose LESS THAN 70 milligrams/deciliter  HYDROmorphone  Injectable 0.5 milliGRAM(s) IV Push every 3 hours PRN Severe Pain  HYDROmorphone  Injectable 0.5 milliGRAM(s) IV Push every 3 hours PRN breakthrough pain  HYDROmorphone  Injectable 0.5 milliGRAM(s) IV Push every 3 hours PRN Severe Pain  magnesium hydroxide Suspension 30 milliLiter(s) Oral daily PRN Constipation  naloxone Injectable 0.1 milliGRAM(s) IV Push every 3 minutes PRN For ANY of the following changes in patient status:  A. RR LESS THAN 10 breaths per minute, B. Oxygen saturation LESS THAN 90%, C. Sedation score of 6  ondansetron Injectable 4 milliGRAM(s) IV Push every 6 hours PRN Nausea and/or Vomiting  oxyCODONE    IR 10 milliGRAM(s) Oral every 3 hours PRN Mild Pain  oxyCODONE    IR 15 milliGRAM(s) Oral every 3 hours PRN Moderate Pain  senna 2 Tablet(s) Oral at bedtime PRN Constipation      Vital Signs Last 24 Hrs  T(C): 36.9 (15 Russel 2018 09:40), Max: 37.4 (15 Russel 2018 02:03)  T(F): 98.4 (15 Russel 2018 09:40), Max: 99.3 (15 Russel 2018 02:03)  HR: 82 (15 Russel 2018 09:40) (76 - 95)  BP: 111/59 (15 Russel 2018 09:40) (94/45 - 112/50)  BP(mean): --  RR: 18 (15 Russel 2018 05:26) (16 - 18)  SpO2: 99% (15 Russel 2018 05:26) (98% - 100%)  CAPILLARY BLOOD GLUCOSE      POCT Blood Glucose.: 92 mg/dL (15 Russel 2018 08:35)  POCT Blood Glucose.: 139 mg/dL (14 Jan 2018 22:12)  POCT Blood Glucose.: 97 mg/dL (14 Jan 2018 16:48)  POCT Blood Glucose.: 109 mg/dL (14 Jan 2018 12:02)    I&O's Summary    14 Jan 2018 07:01  -  15 Russel 2018 07:00  --------------------------------------------------------  IN: 0 mL / OUT: 700 mL / NET: -700 mL          PHYSICAL EXAM  GENERAL: NAD, well-developed  HEAD:  Atraumatic, Normocephalic  EYES: EOMI, PERRLA, conjunctiva and sclera clear  NECK: Supple, No JVD  CHEST/LUNG: Clear to auscultation bilaterally; No wheeze  HEART: Regular rate and rhythm; No murmurs, rubs, or gallops  ABDOMEN: Soft, Nontender, Nondistended; Bowel sounds present  EXTREMITIES:  2+ Peripheral Pulses, No clubbing, cyanosis, or edema  PSYCH: AAOx3  SKIN: No rashes or lesions    LABS:                    RADIOLOGY & ADDITIONAL TESTS:    Imaging Personally Reviewed:  Consultant(s) Notes Reviewed:    Care Discussed with Consultants/Other Providers:

## 2018-01-15 NOTE — OCCUPATIONAL THERAPY INITIAL EVALUATION ADULT - LEVEL OF INDEPENDENCE: SIT/SUPINE, REHAB EVAL
Not assessed. Pt left sitting at edge of bed. No acute distress. Call bell in reach. All lines intact and precautions maintained. RNSoniya made aware. Daughter present bedside.

## 2018-01-16 ENCOUNTER — TRANSCRIPTION ENCOUNTER (OUTPATIENT)
Age: 63
End: 2018-01-16

## 2018-01-16 LAB
GLUCOSE BLDC GLUCOMTR-MCNC: 106 MG/DL — HIGH (ref 70–99)
GLUCOSE BLDC GLUCOMTR-MCNC: 118 MG/DL — HIGH (ref 70–99)
GLUCOSE BLDC GLUCOMTR-MCNC: 136 MG/DL — HIGH (ref 70–99)
GLUCOSE BLDC GLUCOMTR-MCNC: 99 MG/DL — SIGNIFICANT CHANGE UP (ref 70–99)

## 2018-01-16 PROCEDURE — 93970 EXTREMITY STUDY: CPT | Mod: 26

## 2018-01-16 PROCEDURE — 99233 SBSQ HOSP IP/OBS HIGH 50: CPT

## 2018-01-16 RX ORDER — OXYCODONE HYDROCHLORIDE 5 MG/1
1 TABLET ORAL
Qty: 60 | Refills: 0 | OUTPATIENT
Start: 2018-01-16 | End: 2018-01-22

## 2018-01-16 RX ORDER — CELECOXIB 200 MG/1
1 CAPSULE ORAL
Qty: 0 | Refills: 0 | COMMUNITY
Start: 2018-01-16

## 2018-01-16 RX ORDER — PANTOPRAZOLE SODIUM 20 MG/1
1 TABLET, DELAYED RELEASE ORAL
Qty: 30 | Refills: 0
Start: 2018-01-16 | End: 2018-02-14

## 2018-01-16 RX ORDER — CELECOXIB 200 MG/1
1 CAPSULE ORAL
Qty: 30 | Refills: 0 | OUTPATIENT
Start: 2018-01-16 | End: 2018-02-14

## 2018-01-16 RX ORDER — ASPIRIN/CALCIUM CARB/MAGNESIUM 324 MG
1 TABLET ORAL
Qty: 84 | Refills: 0 | OUTPATIENT
Start: 2018-01-16 | End: 2018-02-26

## 2018-01-16 RX ORDER — ASPIRIN/CALCIUM CARB/MAGNESIUM 324 MG
1 TABLET ORAL
Qty: 84 | Refills: 0
Start: 2018-01-16 | End: 2018-02-26

## 2018-01-16 RX ORDER — DOCUSATE SODIUM 100 MG
1 CAPSULE ORAL
Qty: 0 | Refills: 0 | DISCHARGE
Start: 2018-01-16

## 2018-01-16 RX ORDER — SENNA PLUS 8.6 MG/1
2 TABLET ORAL
Qty: 10 | Refills: 0 | OUTPATIENT
Start: 2018-01-16 | End: 2018-01-20

## 2018-01-16 RX ORDER — NAPROXEN AND ESOMEPRAZOLE MAGNESIUM 375; 20 MG/1; MG/1
1 TABLET, DELAYED RELEASE ORAL
Qty: 0 | Refills: 0 | COMMUNITY

## 2018-01-16 RX ORDER — CELECOXIB 200 MG/1
1 CAPSULE ORAL
Qty: 30 | Refills: 0
Start: 2018-01-16 | End: 2018-02-14

## 2018-01-16 RX ORDER — TRAMADOL HYDROCHLORIDE 50 MG/1
1 TABLET ORAL
Qty: 21 | Refills: 0
Start: 2018-01-16 | End: 2018-01-22

## 2018-01-16 RX ORDER — SENNA PLUS 8.6 MG/1
2 TABLET ORAL
Qty: 0 | Refills: 0 | COMMUNITY
Start: 2018-01-16

## 2018-01-16 RX ORDER — TRAMADOL HYDROCHLORIDE 50 MG/1
1 TABLET ORAL
Qty: 21 | Refills: 0 | OUTPATIENT
Start: 2018-01-16 | End: 2018-01-22

## 2018-01-16 RX ORDER — ASPIRIN/CALCIUM CARB/MAGNESIUM 324 MG
1 TABLET ORAL
Qty: 0 | Refills: 0 | COMMUNITY
Start: 2018-01-16

## 2018-01-16 RX ORDER — PANTOPRAZOLE SODIUM 20 MG/1
1 TABLET, DELAYED RELEASE ORAL
Qty: 30 | Refills: 0 | OUTPATIENT
Start: 2018-01-16 | End: 2018-02-14

## 2018-01-16 RX ORDER — SENNA PLUS 8.6 MG/1
1 TABLET ORAL
Qty: 10 | Refills: 0
Start: 2018-01-16 | End: 2018-01-20

## 2018-01-16 RX ORDER — GABAPENTIN 400 MG/1
1 CAPSULE ORAL
Qty: 21 | Refills: 0 | OUTPATIENT
Start: 2018-01-16 | End: 2018-01-22

## 2018-01-16 RX ORDER — OXYCODONE HYDROCHLORIDE 5 MG/1
1 TABLET ORAL
Qty: 0 | Refills: 0 | COMMUNITY
Start: 2018-01-16

## 2018-01-16 RX ORDER — OXYCODONE HYDROCHLORIDE 5 MG/1
1 TABLET ORAL
Qty: 60 | Refills: 0
Start: 2018-01-16 | End: 2018-01-22

## 2018-01-16 RX ORDER — GABAPENTIN 400 MG/1
1 CAPSULE ORAL
Qty: 42 | Refills: 0 | OUTPATIENT
Start: 2018-01-16 | End: 2018-01-29

## 2018-01-16 RX ORDER — TRAMADOL HYDROCHLORIDE 50 MG/1
1 TABLET ORAL
Qty: 0 | Refills: 0 | COMMUNITY
Start: 2018-01-16

## 2018-01-16 RX ORDER — GABAPENTIN 400 MG/1
1 CAPSULE ORAL
Qty: 0 | Refills: 0 | COMMUNITY
Start: 2018-01-16

## 2018-01-16 RX ORDER — GABAPENTIN 400 MG/1
1 CAPSULE ORAL
Qty: 21 | Refills: 0
Start: 2018-01-16 | End: 2018-01-22

## 2018-01-16 RX ORDER — PANTOPRAZOLE SODIUM 20 MG/1
1 TABLET, DELAYED RELEASE ORAL
Qty: 0 | Refills: 0 | COMMUNITY
Start: 2018-01-16

## 2018-01-16 RX ADMIN — OXYCODONE HYDROCHLORIDE 15 MILLIGRAM(S): 5 TABLET ORAL at 21:31

## 2018-01-16 RX ADMIN — Medication 100 MILLIGRAM(S): at 21:31

## 2018-01-16 RX ADMIN — GABAPENTIN 100 MILLIGRAM(S): 400 CAPSULE ORAL at 21:31

## 2018-01-16 RX ADMIN — Medication 325 MILLIGRAM(S): at 06:13

## 2018-01-16 RX ADMIN — OXYCODONE HYDROCHLORIDE 15 MILLIGRAM(S): 5 TABLET ORAL at 12:02

## 2018-01-16 RX ADMIN — CELECOXIB 200 MILLIGRAM(S): 200 CAPSULE ORAL at 10:23

## 2018-01-16 RX ADMIN — TRAMADOL HYDROCHLORIDE 50 MILLIGRAM(S): 50 TABLET ORAL at 14:26

## 2018-01-16 RX ADMIN — OXYCODONE HYDROCHLORIDE 15 MILLIGRAM(S): 5 TABLET ORAL at 22:00

## 2018-01-16 RX ADMIN — GABAPENTIN 100 MILLIGRAM(S): 400 CAPSULE ORAL at 14:26

## 2018-01-16 RX ADMIN — POLYETHYLENE GLYCOL 3350 17 GRAM(S): 17 POWDER, FOR SOLUTION ORAL at 12:38

## 2018-01-16 RX ADMIN — OXYCODONE HYDROCHLORIDE 15 MILLIGRAM(S): 5 TABLET ORAL at 11:05

## 2018-01-16 RX ADMIN — OXYCODONE HYDROCHLORIDE 15 MILLIGRAM(S): 5 TABLET ORAL at 06:13

## 2018-01-16 RX ADMIN — GABAPENTIN 100 MILLIGRAM(S): 400 CAPSULE ORAL at 06:13

## 2018-01-16 RX ADMIN — Medication 100 MILLIGRAM(S): at 14:26

## 2018-01-16 RX ADMIN — OXYCODONE HYDROCHLORIDE 15 MILLIGRAM(S): 5 TABLET ORAL at 02:06

## 2018-01-16 RX ADMIN — Medication 100 MILLIGRAM(S): at 06:13

## 2018-01-16 RX ADMIN — PANTOPRAZOLE SODIUM 40 MILLIGRAM(S): 20 TABLET, DELAYED RELEASE ORAL at 12:38

## 2018-01-16 RX ADMIN — Medication 325 MILLIGRAM(S): at 19:15

## 2018-01-16 RX ADMIN — OXYCODONE HYDROCHLORIDE 15 MILLIGRAM(S): 5 TABLET ORAL at 02:36

## 2018-01-16 RX ADMIN — OXYCODONE HYDROCHLORIDE 15 MILLIGRAM(S): 5 TABLET ORAL at 06:45

## 2018-01-16 NOTE — PROGRESS NOTE ADULT - SUBJECTIVE AND OBJECTIVE BOX
Patient is seen and examined. Denies CP/SOB/Dizziness/N/V/D/HA. Pain is controlled.     Vital Signs Last 24 Hrs  T(C): 37 (16 Jan 2018 06:11), Max: 37.3 (15 Russel 2018 21:16)  T(F): 98.6 (16 Jan 2018 06:11), Max: 99.1 (15 Russel 2018 21:16)  HR: 89 (16 Jan 2018 06:11) (82 - 97)  BP: 118/71 (16 Jan 2018 06:11) (111/59 - 128/65)  BP(mean): --  RR: 18 (16 Jan 2018 06:11) (17 - 18)  SpO2: 97% (16 Jan 2018 06:11) (97% - 100%)    Gen: NAD    LLE: Dressing C/D/I. Calf tenderness.   Motor intact 5/5 BL LE. Sensation is grossly intact in the BL LE. Compartments are soft, extremities are warm. DP 1+ BL LE.

## 2018-01-16 NOTE — DISCHARGE NOTE ADULT - PATIENT PORTAL LINK FT
“You can access the Follow[employer] Patient Portal, offered by [facility], by registering with the following website: [email or web address]”

## 2018-01-16 NOTE — DISCHARGE NOTE ADULT - HOSPITAL COURSE
Please follow up with Dr Harley in 1-2 weeks. Call office to make an appointment. Please follow up with your PMD for continuity of care and management. Please keep aquacel dressing in place until post op day # 14. Any sutures/staples to be removed on post op day # 14.  Please take aspirin twice daily for DVT Prophylaxis. Weight bearing as tolerated. Call orthopaedics with any questions.   Patient is a 62 F y/o s/p left knee arthroplasty on Dr Harley. Patient tolerated the procedure well without any intraoperative complications. Patient states pain is controlled. Tolerated Physical Therapy well. As per Dr Harley patient is stable and ready for discharge. Please follow up with Dr Harley in 1-2 weeks. Call office to make an appointment. Please follow up with your PMD for continuity of care and management. Please keep aquacel dressing in place until post op day # 14. Any sutures/staples to be removed on post op day # 14.  Please take aspirin twice daily for DVT Prophylaxis. Weight bearing as tolerated. Call orthopaedics with any questions.  Patient is a 62 F y/o s/p left knee arthroplasty on Dr Harley. Patient tolerated the procedure well without any intraoperative complications. Patient states pain is controlled. Tolerated Physical Therapy well. As per Dr Harley patient is stable and ready for discharge. Patient had doppler studies performed to rule out any DVT during admission. Patient cleared for discharge per attending.

## 2018-01-16 NOTE — DISCHARGE NOTE ADULT - HOME CARE AGENCY
[facility] - [phone]  Nurse to visit the day after hospital discharge; physical therapist to follow. Please contact the home care agency at the above phone number if you have not heard from them by approximately 12 noon on the day after your hospital discharge. [facility] - [phone]  Nurse to visit the day after hospital discharge; physical therapist to follow. Please contact the home care agency at the above phone number only if you have not heard from them by approximately 12 noon on the day after your hospital discharge.

## 2018-01-16 NOTE — PROGRESS NOTE ADULT - PROBLEM SELECTOR PLAN 1
-s/p L TKR on 1/12, POD #4  Patient c/o LLE pain so doppler ordered to r/o DVT  c/w oxyIR PRN for moderate and dilaudid PRN severe

## 2018-01-16 NOTE — DISCHARGE NOTE ADULT - MEDICATION SUMMARY - MEDICATIONS TO TAKE
I will START or STAY ON the medications listed below when I get home from the hospital:    celecoxib 200 mg oral capsule  -- 1 cap(s) by mouth once a day (before a meal)  -- Indication: For Pain  med     oxyCODONE 10 mg oral tablet  -- 1 tab(s) by mouth every 3 hours, As needed, Mild Pain  -- Indication: For Pain med     oxyCODONE 15 mg oral tablet  -- 1 tab(s) by mouth every 3 hours, As needed, Moderate Pain  -- Indication: For Pain med     traMADol 50 mg oral tablet  -- 1 tab(s) by mouth every 8 hours  -- Indication: For Pain med     aspirin 325 mg oral delayed release tablet  -- 1 tab(s) by mouth 2 times a day  -- Indication: For blood thinner     gabapentin 100 mg oral capsule  -- 1 cap(s) by mouth every 8 hours  -- Indication: For Pain med     Invokamet 150 mg-1000 mg oral tablet  -- 1 tab(s) by mouth 2 times a day (with meals)  -- Indication: For DM     docusate sodium 100 mg oral capsule  -- 1 cap(s) by mouth 3 times a day  -- Indication: For Stool softener     senna oral tablet  -- 2 tab(s) by mouth once a day (at bedtime), As needed, Constipation  -- Indication: For stool softener    pantoprazole 40 mg oral delayed release tablet  -- 1 tab(s) by mouth once a day  -- Indication: For GERD I will START or STAY ON the medications listed below when I get home from the hospital:    celecoxib 200 mg oral capsule  -- 1 cap(s) by mouth once a day (before a meal) MDD:1  -- Indication: For Pain med     oxyCODONE 5 mg oral tablet  -- 2-3 tab(s) by mouth every 6 hours PRN Pain MDD:8  -- Indication: For Pain med     traMADol 50 mg oral tablet  -- 1 tab(s) by mouth every 8 hours MDD:3  -- Indication: For Pain med     aspirin 325 mg oral delayed release tablet  -- 1 tab(s) by mouth 2 times a day MDD:2  -- Indication: For blood thinner     gabapentin 100 mg oral capsule  -- 1 cap(s) by mouth every 8 hours MDD:3  -- Indication: For Pain med     Invokamet 150 mg-1000 mg oral tablet  -- 1 tab(s) by mouth 2 times a day (with meals)  -- Indication: For DM     docusate sodium 100 mg oral capsule  -- 1 cap(s) by mouth 3 times a day  -- Indication: For Stool softener     senna oral tablet  -- 2 tab(s) by mouth once a day (at bedtime), As needed, Constipation MDD:2  -- Indication: For stool softener    pantoprazole 40 mg oral delayed release tablet  -- 1 tab(s) by mouth once a day MDD:1  -- Indication: For GERD I will START or STAY ON the medications listed below when I get home from the hospital:    celecoxib 200 mg oral capsule  -- 1 cap(s) by mouth once a day (before a meal) MDD:1  -- Indication: For Pain med     oxyCODONE 5 mg oral tablet  -- 2-3 tab(s) by mouth every 6 hours PRN Pain MDD:8  -- Indication: For Pain med     traMADol 50 mg oral tablet  -- 1 tab(s) by mouth every 8 hours MDD:3  -- Indication: For Pain med     aspirin 325 mg oral delayed release tablet  -- 1 tab(s) by mouth 2 times a day MDD:2  -- Indication: For blood thinner     gabapentin 100 mg oral capsule  -- 1 cap(s) by mouth every 8 hours MDD:3  -- Indication: For Pain med     Invokamet 150 mg-1000 mg oral tablet  -- 1 tab(s) by mouth 2 times a day (with meals)  -- Indication: For DM     docusate sodium 100 mg oral capsule  -- 1 cap(s) by mouth 3 times a day  -- Indication: For Stool softener     senna oral tablet  -- 1 tab(s) by mouth once a day (at bedtime), As Needed -Constipation MDD:2   -- Indication: For stool softener     pantoprazole 40 mg oral delayed release tablet  -- 1 tab(s) by mouth once a day MDD:1  -- Indication: For GERD

## 2018-01-16 NOTE — DISCHARGE NOTE ADULT - CARE PROVIDER_API CALL
Roman Harley (MD), Orthopaedic Surgery  [address] [place] [address]  Phone: [phone]  Fax: [phone]

## 2018-01-16 NOTE — PROGRESS NOTE ADULT - SUBJECTIVE AND OBJECTIVE BOX
Patient is a 62y old  Female who presents with a chief complaint of DJD of the left total knee (16 Jan 2018 08:52)      SUBJECTIVE / OVERNIGHT EVENTS:  Patient c/o LLE pain. Denies cp, SOB, abdominal pain, N/V/D     MEDICATIONS  (STANDING):  aspirin enteric coated 325 milliGRAM(s) Oral two times a day  celecoxib 200 milliGRAM(s) Oral with breakfast  dextrose 5%. 1000 milliLiter(s) (50 mL/Hr) IV Continuous <Continuous>  dextrose 50% Injectable 12.5 Gram(s) IV Push once  dextrose 50% Injectable 25 Gram(s) IV Push once  dextrose 50% Injectable 25 Gram(s) IV Push once  docusate sodium 100 milliGRAM(s) Oral three times a day  gabapentin 100 milliGRAM(s) Oral every 8 hours  insulin lispro (HumaLOG) corrective regimen sliding scale   SubCutaneous three times a day before meals  insulin lispro (HumaLOG) corrective regimen sliding scale   SubCutaneous at bedtime  metoclopramide Injectable 5 milliGRAM(s) IV Push once  pantoprazole    Tablet 40 milliGRAM(s) Oral daily  polyethylene glycol 3350 17 Gram(s) Oral daily  sodium chloride 0.9%. 1000 milliLiter(s) (100 mL/Hr) IV Continuous <Continuous>  traMADol 50 milliGRAM(s) Oral every 8 hours    MEDICATIONS  (PRN):  aluminum hydroxide/magnesium hydroxide/simethicone Suspension 30 milliLiter(s) Oral four times a day PRN Indigestion  bisacodyl Suppository 10 milliGRAM(s) Rectal daily PRN If no bowel movement by postoperative day #2  dextrose Gel 1 Dose(s) Oral once PRN Blood Glucose LESS THAN 70 milliGRAM(s)/deciliter  glucagon  Injectable 1 milliGRAM(s) IntraMuscular once PRN Glucose LESS THAN 70 milligrams/deciliter  HYDROmorphone  Injectable 0.5 milliGRAM(s) IV Push every 3 hours PRN Severe Pain  HYDROmorphone  Injectable 0.5 milliGRAM(s) IV Push every 3 hours PRN breakthrough pain  HYDROmorphone  Injectable 0.5 milliGRAM(s) IV Push every 3 hours PRN Severe Pain  magnesium hydroxide Suspension 30 milliLiter(s) Oral daily PRN Constipation  naloxone Injectable 0.1 milliGRAM(s) IV Push every 3 minutes PRN For ANY of the following changes in patient status:  A. RR LESS THAN 10 breaths per minute, B. Oxygen saturation LESS THAN 90%, C. Sedation score of 6  ondansetron Injectable 4 milliGRAM(s) IV Push every 6 hours PRN Nausea and/or Vomiting  oxyCODONE    IR 10 milliGRAM(s) Oral every 3 hours PRN Mild Pain  oxyCODONE    IR 15 milliGRAM(s) Oral every 3 hours PRN Moderate Pain  senna 2 Tablet(s) Oral at bedtime PRN Constipation      Vital Signs Last 24 Hrs  T(C): 36.9 (16 Jan 2018 10:23), Max: 37.3 (15 Russel 2018 21:16)  T(F): 98.4 (16 Jan 2018 10:23), Max: 99.1 (15 Russel 2018 21:16)  HR: 73 (16 Jan 2018 10:23) (73 - 97)  BP: 121/59 (16 Jan 2018 10:23) (118/71 - 128/65)  BP(mean): --  RR: 10 (16 Jan 2018 10:23) (10 - 18)  SpO2: 100% (16 Jan 2018 10:23) (97% - 100%)  CAPILLARY BLOOD GLUCOSE      POCT Blood Glucose.: 99 mg/dL (16 Jan 2018 08:10)  POCT Blood Glucose.: 104 mg/dL (15 Russel 2018 22:17)  POCT Blood Glucose.: 136 mg/dL (15 Russel 2018 17:07)  POCT Blood Glucose.: 121 mg/dL (15 Russel 2018 12:03)    I&O's Summary      PHYSICAL EXAM:  GENERAL: NAD, well-developed  HEAD:  Atraumatic, Normocephalic  EYES: EOMI, PERRLA, conjunctiva and sclera clear  NECK: Supple, No JVD  CHEST/LUNG: Clear to auscultation bilaterally; No wheeze  HEART: Regular rate and rhythm; No murmurs, rubs, or gallops  ABDOMEN: Soft, Nontender, Nondistended; Bowel sounds present  EXTREMITIES:  2+ Peripheral Pulses, No clubbing, cyanosis, or edema  PSYCH: AAOx3  NEUROLOGY: non-focal  SKIN: No rashes or lesions    LABS:                    RADIOLOGY & ADDITIONAL TESTS:    Imaging Personally Reviewed:    Consultant(s) Notes Reviewed:      Care Discussed with Consultants/Other Providers: Ortho Patient is a 62y old  Female who presents with a chief complaint of DJD of the left total knee (16 Jan 2018 08:52)      SUBJECTIVE / OVERNIGHT EVENTS:  Patient c/o LLE calf pain and swelling. Denies cp, SOB, abdominal pain, N/V/D     MEDICATIONS  (STANDING):  aspirin enteric coated 325 milliGRAM(s) Oral two times a day  celecoxib 200 milliGRAM(s) Oral with breakfast  dextrose 5%. 1000 milliLiter(s) (50 mL/Hr) IV Continuous <Continuous>  dextrose 50% Injectable 12.5 Gram(s) IV Push once  dextrose 50% Injectable 25 Gram(s) IV Push once  dextrose 50% Injectable 25 Gram(s) IV Push once  docusate sodium 100 milliGRAM(s) Oral three times a day  gabapentin 100 milliGRAM(s) Oral every 8 hours  insulin lispro (HumaLOG) corrective regimen sliding scale   SubCutaneous three times a day before meals  insulin lispro (HumaLOG) corrective regimen sliding scale   SubCutaneous at bedtime  metoclopramide Injectable 5 milliGRAM(s) IV Push once  pantoprazole    Tablet 40 milliGRAM(s) Oral daily  polyethylene glycol 3350 17 Gram(s) Oral daily  sodium chloride 0.9%. 1000 milliLiter(s) (100 mL/Hr) IV Continuous <Continuous>  traMADol 50 milliGRAM(s) Oral every 8 hours    MEDICATIONS  (PRN):  aluminum hydroxide/magnesium hydroxide/simethicone Suspension 30 milliLiter(s) Oral four times a day PRN Indigestion  bisacodyl Suppository 10 milliGRAM(s) Rectal daily PRN If no bowel movement by postoperative day #2  dextrose Gel 1 Dose(s) Oral once PRN Blood Glucose LESS THAN 70 milliGRAM(s)/deciliter  glucagon  Injectable 1 milliGRAM(s) IntraMuscular once PRN Glucose LESS THAN 70 milligrams/deciliter  HYDROmorphone  Injectable 0.5 milliGRAM(s) IV Push every 3 hours PRN Severe Pain  HYDROmorphone  Injectable 0.5 milliGRAM(s) IV Push every 3 hours PRN breakthrough pain  HYDROmorphone  Injectable 0.5 milliGRAM(s) IV Push every 3 hours PRN Severe Pain  magnesium hydroxide Suspension 30 milliLiter(s) Oral daily PRN Constipation  naloxone Injectable 0.1 milliGRAM(s) IV Push every 3 minutes PRN For ANY of the following changes in patient status:  A. RR LESS THAN 10 breaths per minute, B. Oxygen saturation LESS THAN 90%, C. Sedation score of 6  ondansetron Injectable 4 milliGRAM(s) IV Push every 6 hours PRN Nausea and/or Vomiting  oxyCODONE    IR 10 milliGRAM(s) Oral every 3 hours PRN Mild Pain  oxyCODONE    IR 15 milliGRAM(s) Oral every 3 hours PRN Moderate Pain  senna 2 Tablet(s) Oral at bedtime PRN Constipation      Vital Signs Last 24 Hrs  T(C): 36.9 (16 Jan 2018 10:23), Max: 37.3 (15 Russel 2018 21:16)  T(F): 98.4 (16 Jan 2018 10:23), Max: 99.1 (15 Russel 2018 21:16)  HR: 73 (16 Jan 2018 10:23) (73 - 97)  BP: 121/59 (16 Jan 2018 10:23) (118/71 - 128/65)  BP(mean): --  RR: 10 (16 Jan 2018 10:23) (10 - 18)  SpO2: 100% (16 Jan 2018 10:23) (97% - 100%)  CAPILLARY BLOOD GLUCOSE      POCT Blood Glucose.: 99 mg/dL (16 Jan 2018 08:10)  POCT Blood Glucose.: 104 mg/dL (15 Russel 2018 22:17)  POCT Blood Glucose.: 136 mg/dL (15 Russel 2018 17:07)  POCT Blood Glucose.: 121 mg/dL (15 Russel 2018 12:03)    I&O's Summary      PHYSICAL EXAM:  GENERAL: NAD, well-developed  HEAD:  Atraumatic, Normocephalic  EYES: EOMI, PERRLA, conjunctiva and sclera clear  NECK: Supple, No JVD  CHEST/LUNG: Clear to auscultation bilaterally; No wheeze  HEART: Regular rate and rhythm; No murmurs, rubs, or gallops  ABDOMEN: Soft, Nontender, Nondistended; Bowel sounds present  EXTREMITIES:  left calf and ankle swelling with tenderness. 2+ Peripheral Pulses, No clubbing, or cyanosis.   PSYCH: AAOx3  NEUROLOGY: non-focal  SKIN: No rashes or lesions    LABS:                    RADIOLOGY & ADDITIONAL TESTS:    Imaging Personally Reviewed:    Consultant(s) Notes Reviewed:      Care Discussed with Consultants/Other Providers: Ortho

## 2018-01-16 NOTE — DISCHARGE NOTE ADULT - PLAN OF CARE
Pain reduction, improve ambulation and ADLs Please follow up with Dr Harley in 1-2 weeks. Call office to make an appointment. Please follow up with your PMD for continuity of care and management. Please keep aquacel dressing in place until post op day # 14. Any sutures/staples to be removed on post op day # 14.  Please take aspirin twice daily for DVT Prophylaxis. Weight bearing as tolerated. Call orthopaedics with any questions.

## 2018-01-16 NOTE — DISCHARGE NOTE ADULT - MEDICATION SUMMARY - MEDICATIONS TO STOP TAKING
I will STOP taking the medications listed below when I get home from the hospital:    Aleve 220 mg oral tablet  -- 1 tab(s) by mouth every 8 hours, As Needed. Last dose 1/4/18

## 2018-01-16 NOTE — DISCHARGE NOTE ADULT - DURABLE MEDICAL EQUIPMENT AGENCY
[employer] - [phone]  the above company delivered a rolling walker to patient in hospital on 1/17/18.

## 2018-01-16 NOTE — PROGRESS NOTE ADULT - SUBJECTIVE AND OBJECTIVE BOX
POD #4 Left TKR    Less pain today    Vital Signs Last 24 Hrs  T(C): 37 (16 Jan 2018 06:11), Max: 37.3 (15 Russel 2018 21:16)  T(F): 98.6 (16 Jan 2018 06:11), Max: 99.1 (15 Russel 2018 21:16)  HR: 89 (16 Jan 2018 06:11) (82 - 97)  BP: 118/71 (16 Jan 2018 06:11) (111/59 - 128/65)  BP(mean): --  RR: 18 (16 Jan 2018 06:11) (17 - 18)  SpO2: 97% (16 Jan 2018 06:11) (97% - 100%)    Knee tender. Calf non tender. Mild ankle swelling    Continue rehab  Discharge planning

## 2018-01-16 NOTE — PROVIDER CONTACT NOTE (OTHER) - SITUATION
Pt. c/o of LLE calf tenderness, patient concerned about leg swelling near knee/ankle, L medial ankle ecchymosis noted

## 2018-01-16 NOTE — DISCHARGE NOTE ADULT - NS AS DC FOLLOWUP STROKE INST
Influenza vaccination (VIS Pub Date: August 19, 2014)/Smoking Cessation/knee, exercise worksheet Smoking Cessation/knee, exercise worksheet

## 2018-01-16 NOTE — DISCHARGE NOTE ADULT - CARE PLAN
Principal Discharge DX:	Osteoarthritis  Goal:	Pain reduction, improve ambulation and ADLs  Instructions for follow-up, activity and diet:	Please follow up with Dr Harley in 1-2 weeks. Call office to make an appointment. Please follow up with your PMD for continuity of care and management. Please keep aquacel dressing in place until post op day # 14. Any sutures/staples to be removed on post op day # 14.  Please take aspirin twice daily for DVT Prophylaxis. Weight bearing as tolerated. Call orthopaedics with any questions. Principal Discharge DX:	Osteoarthritis  Goal:	Pain reduction, improve ambulation and ADLs  Assessment and plan of treatment:	Please follow up with Dr Harley in 1-2 weeks. Call office to make an appointment. Please follow up with your PMD for continuity of care and management. Please keep aquacel dressing in place until post op day # 14. Any sutures/staples to be removed on post op day # 14.  Please take aspirin twice daily for DVT Prophylaxis. Weight bearing as tolerated. Call orthopaedics with any questions.

## 2018-01-16 NOTE — DISCHARGE NOTE ADULT - INSTRUCTIONS
may resume diet as prior to surgery You have a post op appointment with Dr. Harley on January 26, 2018 @ 9:45am. If you are unable to keep this appointment, please call the office to reschedule. Call MD if you develop a fever, or if there is redness, swelling, drainage or pain not relieved by pain medication. No heavy lifting, bending, or straining to move your bowels. Take over the counter stool softeners as needed to prevent constipation which may be caused by pain medication.

## 2018-01-17 VITALS
SYSTOLIC BLOOD PRESSURE: 116 MMHG | RESPIRATION RATE: 17 BRPM | TEMPERATURE: 98 F | HEART RATE: 87 BPM | DIASTOLIC BLOOD PRESSURE: 79 MMHG | OXYGEN SATURATION: 99 %

## 2018-01-17 LAB
GLUCOSE BLDC GLUCOMTR-MCNC: 103 MG/DL — HIGH (ref 70–99)
GLUCOSE BLDC GLUCOMTR-MCNC: 119 MG/DL — HIGH (ref 70–99)

## 2018-01-17 PROCEDURE — 99233 SBSQ HOSP IP/OBS HIGH 50: CPT

## 2018-01-17 RX ADMIN — OXYCODONE HYDROCHLORIDE 10 MILLIGRAM(S): 5 TABLET ORAL at 11:30

## 2018-01-17 RX ADMIN — Medication 100 MILLIGRAM(S): at 06:50

## 2018-01-17 RX ADMIN — OXYCODONE HYDROCHLORIDE 10 MILLIGRAM(S): 5 TABLET ORAL at 10:56

## 2018-01-17 RX ADMIN — CELECOXIB 200 MILLIGRAM(S): 200 CAPSULE ORAL at 08:58

## 2018-01-17 RX ADMIN — GABAPENTIN 100 MILLIGRAM(S): 400 CAPSULE ORAL at 06:50

## 2018-01-17 RX ADMIN — OXYCODONE HYDROCHLORIDE 15 MILLIGRAM(S): 5 TABLET ORAL at 04:11

## 2018-01-17 RX ADMIN — PANTOPRAZOLE SODIUM 40 MILLIGRAM(S): 20 TABLET, DELAYED RELEASE ORAL at 11:00

## 2018-01-17 RX ADMIN — Medication 325 MILLIGRAM(S): at 06:50

## 2018-01-17 RX ADMIN — OXYCODONE HYDROCHLORIDE 15 MILLIGRAM(S): 5 TABLET ORAL at 03:41

## 2018-01-17 RX ADMIN — TRAMADOL HYDROCHLORIDE 50 MILLIGRAM(S): 50 TABLET ORAL at 06:50

## 2018-01-17 NOTE — PROGRESS NOTE ADULT - SUBJECTIVE AND OBJECTIVE BOX
Patient seen and examined. Pain controlled. No acute events overnight. Patient walked with PT.    MEDICATIONS  (STANDING):  aspirin enteric coated 325 milliGRAM(s) Oral two times a day  celecoxib 200 milliGRAM(s) Oral with breakfast  dextrose 5%. 1000 milliLiter(s) IV Continuous <Continuous>  dextrose 50% Injectable 12.5 Gram(s) IV Push once  dextrose 50% Injectable 25 Gram(s) IV Push once  dextrose 50% Injectable 25 Gram(s) IV Push once  docusate sodium 100 milliGRAM(s) Oral three times a day  gabapentin 100 milliGRAM(s) Oral every 8 hours  insulin lispro (HumaLOG) corrective regimen sliding scale   SubCutaneous three times a day before meals  insulin lispro (HumaLOG) corrective regimen sliding scale   SubCutaneous at bedtime  metoclopramide Injectable 5 milliGRAM(s) IV Push once  pantoprazole    Tablet 40 milliGRAM(s) Oral daily  polyethylene glycol 3350 17 Gram(s) Oral daily  traMADol 50 milliGRAM(s) Oral every 8 hours    Allergies    No Known Allergies    Intolerances      Vital Signs Last 24 Hrs  T(C): 37.1 (01-17-18 @ 02:12), Max: 37.2 (01-16-18 @ 21:27)  T(F): 98.7 (01-17-18 @ 02:12), Max: 99 (01-16-18 @ 21:27)  HR: 92 (01-17-18 @ 02:12) (73 - 92)  BP: 125/63 (01-17-18 @ 02:12) (113/57 - 133/64)  BP(mean): --  RR: 18 (01-17-18 @ 02:12) (10 - 18)  SpO2: 97% (01-17-18 @ 02:12) (96% - 100%)    Physical Exam  Gen: NAD  LLE:   Dressing c/d/i  +ehl/fhl/ta/gs function  L2-S1 silt  Dp/pt pulse intact  No calf ttp  Compartments soft    A/P: 62y Female sp L TKA POD5  Pain control  DVT ppx, asa bid  PT/WBAT/OOB  FU labs  Ice/elevate  Medical management appreciated  Incentive spirometry  Dispo planning, home

## 2018-01-17 NOTE — PROGRESS NOTE ADULT - PROBLEM SELECTOR PLAN 1
-s/p L TKR on 1/12, POD #5  Patient c/o LLE pain but doppler negative for  DVT.  recommend elevating LLE to 45 degrees while lying flat  c/w oxyIR PRN for moderate and dilaudid PRN severe -s/p L TKR on 1/12, POD #5  Patient c/o LLE pain but doppler negative for  DVT.  recommend elevating LLE to 45 degrees while lying flat  c/w oxyIR PRN for moderate and dilaudid PRN severe  d/c planning as per primary team.

## 2018-01-17 NOTE — PROGRESS NOTE ADULT - SUBJECTIVE AND OBJECTIVE BOX
Patient is a 62y old  Female who presents with a chief complaint of DJD of the left total knee (16 Jan 2018 08:52)      SUBJECTIVE / OVERNIGHT EVENTS:  Patient has no new complaints. Denies cp, SOB, abdominal pain, N/V/D.    MEDICATIONS  (STANDING):  aspirin enteric coated 325 milliGRAM(s) Oral two times a day  celecoxib 200 milliGRAM(s) Oral with breakfast  dextrose 5%. 1000 milliLiter(s) (50 mL/Hr) IV Continuous <Continuous>  dextrose 50% Injectable 12.5 Gram(s) IV Push once  dextrose 50% Injectable 25 Gram(s) IV Push once  dextrose 50% Injectable 25 Gram(s) IV Push once  docusate sodium 100 milliGRAM(s) Oral three times a day  gabapentin 100 milliGRAM(s) Oral every 8 hours  insulin lispro (HumaLOG) corrective regimen sliding scale   SubCutaneous three times a day before meals  insulin lispro (HumaLOG) corrective regimen sliding scale   SubCutaneous at bedtime  metoclopramide Injectable 5 milliGRAM(s) IV Push once  pantoprazole    Tablet 40 milliGRAM(s) Oral daily  polyethylene glycol 3350 17 Gram(s) Oral daily  traMADol 50 milliGRAM(s) Oral every 8 hours    MEDICATIONS  (PRN):  aluminum hydroxide/magnesium hydroxide/simethicone Suspension 30 milliLiter(s) Oral four times a day PRN Indigestion  bisacodyl Suppository 10 milliGRAM(s) Rectal daily PRN If no bowel movement by postoperative day #2  dextrose Gel 1 Dose(s) Oral once PRN Blood Glucose LESS THAN 70 milliGRAM(s)/deciliter  glucagon  Injectable 1 milliGRAM(s) IntraMuscular once PRN Glucose LESS THAN 70 milligrams/deciliter  HYDROmorphone  Injectable 0.5 milliGRAM(s) IV Push every 3 hours PRN Severe Pain  HYDROmorphone  Injectable 0.5 milliGRAM(s) IV Push every 3 hours PRN breakthrough pain  HYDROmorphone  Injectable 0.5 milliGRAM(s) IV Push every 3 hours PRN Severe Pain  magnesium hydroxide Suspension 30 milliLiter(s) Oral daily PRN Constipation  naloxone Injectable 0.1 milliGRAM(s) IV Push every 3 minutes PRN For ANY of the following changes in patient status:  A. RR LESS THAN 10 breaths per minute, B. Oxygen saturation LESS THAN 90%, C. Sedation score of 6  ondansetron Injectable 4 milliGRAM(s) IV Push every 6 hours PRN Nausea and/or Vomiting  oxyCODONE    IR 10 milliGRAM(s) Oral every 3 hours PRN Mild Pain  oxyCODONE    IR 15 milliGRAM(s) Oral every 3 hours PRN Moderate Pain  senna 2 Tablet(s) Oral at bedtime PRN Constipation      Vital Signs Last 24 Hrs  T(C): 36.9 (17 Jan 2018 09:35), Max: 37.2 (16 Jan 2018 21:27)  T(F): 98.5 (17 Jan 2018 09:35), Max: 99 (16 Jan 2018 21:27)  HR: 87 (17 Jan 2018 09:35) (74 - 96)  BP: 116/79 (17 Jan 2018 09:35) (113/57 - 133/65)  BP(mean): --  RR: 17 (17 Jan 2018 09:35) (17 - 18)  SpO2: 99% (17 Jan 2018 09:35) (96% - 100%)  CAPILLARY BLOOD GLUCOSE      POCT Blood Glucose.: 103 mg/dL (17 Jan 2018 08:40)  POCT Blood Glucose.: 106 mg/dL (16 Jan 2018 22:24)  POCT Blood Glucose.: 136 mg/dL (16 Jan 2018 17:00)  POCT Blood Glucose.: 118 mg/dL (16 Jan 2018 11:56)    I&O's Summary      PHYSICAL EXAM:  GENERAL: NAD, well-developed  HEAD:  Atraumatic, Normocephalic  EYES: EOMI, PERRLA, conjunctiva and sclera clear  NECK: Supple, No JVD  CHEST/LUNG: Clear to auscultation bilaterally; No wheeze  HEART: Regular rate and rhythm; No murmurs, rubs, or gallops  ABDOMEN: Soft, Nontender, Nondistended; Bowel sounds present  EXTREMITIES:  2+ Peripheral Pulses, No clubbing, cyanosis, or edema  PSYCH: AAOx3  NEUROLOGY: non-focal  SKIN: No rashes or lesions    LABS:                    RADIOLOGY & ADDITIONAL TESTS:    Imaging Personally Reviewed: < from: US Duplex Venous Lower Ext Complete, Bilateral (01.16.18 @ 15:31) >  IMPRESSION:     No evidence of bilateral lower extremity deep venous thrombosis.        < end of copied text >      Consultant(s) Notes Reviewed:      Care Discussed with Consultants/Other Providers: ortho Patient is a 62y old  Female who presents with a chief complaint of DJD of the left total knee (16 Jan 2018 08:52)      SUBJECTIVE / OVERNIGHT EVENTS:  Patient has no new complaints. She reports decreased pain and swelling in left calf.  Denies cp, SOB, abdominal pain, N/V/D.    MEDICATIONS  (STANDING):  aspirin enteric coated 325 milliGRAM(s) Oral two times a day  celecoxib 200 milliGRAM(s) Oral with breakfast  dextrose 5%. 1000 milliLiter(s) (50 mL/Hr) IV Continuous <Continuous>  dextrose 50% Injectable 12.5 Gram(s) IV Push once  dextrose 50% Injectable 25 Gram(s) IV Push once  dextrose 50% Injectable 25 Gram(s) IV Push once  docusate sodium 100 milliGRAM(s) Oral three times a day  gabapentin 100 milliGRAM(s) Oral every 8 hours  insulin lispro (HumaLOG) corrective regimen sliding scale   SubCutaneous three times a day before meals  insulin lispro (HumaLOG) corrective regimen sliding scale   SubCutaneous at bedtime  metoclopramide Injectable 5 milliGRAM(s) IV Push once  pantoprazole    Tablet 40 milliGRAM(s) Oral daily  polyethylene glycol 3350 17 Gram(s) Oral daily  traMADol 50 milliGRAM(s) Oral every 8 hours    MEDICATIONS  (PRN):  aluminum hydroxide/magnesium hydroxide/simethicone Suspension 30 milliLiter(s) Oral four times a day PRN Indigestion  bisacodyl Suppository 10 milliGRAM(s) Rectal daily PRN If no bowel movement by postoperative day #2  dextrose Gel 1 Dose(s) Oral once PRN Blood Glucose LESS THAN 70 milliGRAM(s)/deciliter  glucagon  Injectable 1 milliGRAM(s) IntraMuscular once PRN Glucose LESS THAN 70 milligrams/deciliter  HYDROmorphone  Injectable 0.5 milliGRAM(s) IV Push every 3 hours PRN Severe Pain  HYDROmorphone  Injectable 0.5 milliGRAM(s) IV Push every 3 hours PRN breakthrough pain  HYDROmorphone  Injectable 0.5 milliGRAM(s) IV Push every 3 hours PRN Severe Pain  magnesium hydroxide Suspension 30 milliLiter(s) Oral daily PRN Constipation  naloxone Injectable 0.1 milliGRAM(s) IV Push every 3 minutes PRN For ANY of the following changes in patient status:  A. RR LESS THAN 10 breaths per minute, B. Oxygen saturation LESS THAN 90%, C. Sedation score of 6  ondansetron Injectable 4 milliGRAM(s) IV Push every 6 hours PRN Nausea and/or Vomiting  oxyCODONE    IR 10 milliGRAM(s) Oral every 3 hours PRN Mild Pain  oxyCODONE    IR 15 milliGRAM(s) Oral every 3 hours PRN Moderate Pain  senna 2 Tablet(s) Oral at bedtime PRN Constipation      Vital Signs Last 24 Hrs  T(C): 36.9 (17 Jan 2018 09:35), Max: 37.2 (16 Jan 2018 21:27)  T(F): 98.5 (17 Jan 2018 09:35), Max: 99 (16 Jan 2018 21:27)  HR: 87 (17 Jan 2018 09:35) (74 - 96)  BP: 116/79 (17 Jan 2018 09:35) (113/57 - 133/65)  BP(mean): --  RR: 17 (17 Jan 2018 09:35) (17 - 18)  SpO2: 99% (17 Jan 2018 09:35) (96% - 100%)  CAPILLARY BLOOD GLUCOSE      POCT Blood Glucose.: 103 mg/dL (17 Jan 2018 08:40)  POCT Blood Glucose.: 106 mg/dL (16 Jan 2018 22:24)  POCT Blood Glucose.: 136 mg/dL (16 Jan 2018 17:00)  POCT Blood Glucose.: 118 mg/dL (16 Jan 2018 11:56)    I&O's Summary      PHYSICAL EXAM:  GENERAL: NAD, well-developed  HEAD:  Atraumatic, Normocephalic  EYES: EOMI, PERRLA, conjunctiva and sclera clear  NECK: Supple, No JVD  CHEST/LUNG: Clear to auscultation bilaterally; No wheeze  HEART: Regular rate and rhythm; No murmurs, rubs, or gallops  ABDOMEN: Soft, Nontender, Nondistended; Bowel sounds present  EXTREMITIES:  decreased left calf pain and swelling. 2+ Peripheral Pulses, No clubbing, and  cyanosis  PSYCH: AAOx3  NEUROLOGY: non-focal  SKIN: No rashes or lesions    LABS:                    RADIOLOGY & ADDITIONAL TESTS:    Imaging Personally Reviewed: < from: US Duplex Venous Lower Ext Complete, Bilateral (01.16.18 @ 15:31) >  IMPRESSION:     No evidence of bilateral lower extremity deep venous thrombosis.        < end of copied text >      Consultant(s) Notes Reviewed:      Care Discussed with Consultants/Other Providers: ortho

## 2018-01-17 NOTE — PROGRESS NOTE ADULT - PROBLEM SELECTOR PLAN 3
listed in pt's history - however she denies this - not on any meds, denies any SOB.

## 2018-01-17 NOTE — PROGRESS NOTE ADULT - PROVIDER SPECIALTY LIST ADULT
Anesthesia
Hospitalist
Hospitalist
Orthopedics
Pain Medicine
Hospitalist
Orthopedics
Hospitalist

## 2018-01-17 NOTE — PROGRESS NOTE ADULT - PROBLEM SELECTOR PLAN 4
ASA BID for DVT ppx as per Ortho recs    PT eval -> YONI.

## 2018-01-17 NOTE — PROGRESS NOTE ADULT - ASSESSMENT
63 yo F w/ NIDDM and L knee OA s/p L. TKR on 1/12/18
A/P: Patient is a 62y Female s/p total knee arthroplasty, POD # 4    - Pain control / Analgesia  -Monitor HV output  - Anticoagulation ASA BID  -PT/OT  -WBAT  -OOB  -Inc Spirometry  -Foot Pumps  -F/U AM Labs  -Notify Ortho with any questions
63 yo F w/ NIDDM and L knee OA s/p L. TKR on 1/12/18

## 2018-01-17 NOTE — PROGRESS NOTE ADULT - SUBJECTIVE AND OBJECTIVE BOX
POD #5 Left TKR    No complaints  Vital Signs Last 24 Hrs  T(C): 37 (17 Jan 2018 06:41), Max: 37.2 (16 Jan 2018 21:27)  T(F): 98.6 (17 Jan 2018 06:41), Max: 99 (16 Jan 2018 21:27)  HR: 96 (17 Jan 2018 06:41) (73 - 96)  BP: 133/65 (17 Jan 2018 06:41) (113/57 - 133/65)  BP(mean): --  RR: 18 (17 Jan 2018 06:41) (10 - 18)  SpO2: 98% (17 Jan 2018 06:41) (96% - 100%)    Left knee dressing intact  +SLR  Calf soft and non tender  Doppler negative    Continue rehab  Discharge

## 2018-01-17 NOTE — PROGRESS NOTE ADULT - PROBLEM SELECTOR PLAN 2
Hga1c 5.8  holding home oral agents  c/w ISS  FS very well controlled.

## 2018-01-23 LAB — SURGICAL PATHOLOGY STUDY: SIGNIFICANT CHANGE UP

## 2018-01-26 ENCOUNTER — APPOINTMENT (OUTPATIENT)
Dept: ORTHOPEDIC SURGERY | Facility: CLINIC | Age: 63
End: 2018-01-26
Payer: COMMERCIAL

## 2018-01-26 VITALS
HEART RATE: 80 BPM | SYSTOLIC BLOOD PRESSURE: 125 MMHG | WEIGHT: 120 LBS | BODY MASS INDEX: 25.19 KG/M2 | HEIGHT: 58 IN | DIASTOLIC BLOOD PRESSURE: 75 MMHG

## 2018-01-26 PROCEDURE — 73562 X-RAY EXAM OF KNEE 3: CPT | Mod: LT

## 2018-01-26 PROCEDURE — 99024 POSTOP FOLLOW-UP VISIT: CPT

## 2018-01-26 RX ORDER — GABAPENTIN 100 MG/1
100 CAPSULE ORAL
Qty: 21 | Refills: 0 | Status: ACTIVE | COMMUNITY
Start: 2018-01-16

## 2018-01-26 RX ORDER — PANTOPRAZOLE 40 MG/1
40 TABLET, DELAYED RELEASE ORAL
Qty: 30 | Refills: 0 | Status: ACTIVE | COMMUNITY
Start: 2018-01-16

## 2018-01-26 RX ORDER — OXYCODONE 5 MG/1
5 TABLET ORAL
Qty: 60 | Refills: 0 | Status: ACTIVE | COMMUNITY
Start: 2018-01-16

## 2018-01-26 RX ORDER — EZETIMIBE 10 MG/1
10 TABLET ORAL
Qty: 30 | Refills: 0 | Status: ACTIVE | COMMUNITY
Start: 2017-10-04

## 2018-01-26 RX ORDER — TRAMADOL HYDROCHLORIDE 50 MG/1
50 TABLET, COATED ORAL
Qty: 21 | Refills: 0 | Status: ACTIVE | COMMUNITY
Start: 2018-01-16

## 2018-01-26 RX ORDER — DOXYCYCLINE HYCLATE 100 MG/1
100 TABLET ORAL
Qty: 20 | Refills: 0 | Status: ACTIVE | COMMUNITY
Start: 2017-12-13

## 2018-01-26 RX ORDER — TRAMADOL HYDROCHLORIDE 50 MG/1
50 TABLET, COATED ORAL
Qty: 30 | Refills: 0 | Status: ACTIVE | COMMUNITY
Start: 2018-01-26 | End: 1900-01-01

## 2018-02-20 ENCOUNTER — APPOINTMENT (OUTPATIENT)
Dept: ORTHOPEDIC SURGERY | Facility: CLINIC | Age: 63
End: 2018-02-20
Payer: COMMERCIAL

## 2018-02-20 VITALS
DIASTOLIC BLOOD PRESSURE: 62 MMHG | SYSTOLIC BLOOD PRESSURE: 107 MMHG | HEART RATE: 87 BPM | BODY MASS INDEX: 25.19 KG/M2 | WEIGHT: 120 LBS | HEIGHT: 58 IN

## 2018-02-20 PROCEDURE — 99024 POSTOP FOLLOW-UP VISIT: CPT

## 2018-02-20 RX ORDER — CELECOXIB 200 MG/1
200 CAPSULE ORAL DAILY
Qty: 30 | Refills: 2 | Status: ACTIVE | COMMUNITY
Start: 2018-01-16 | End: 1900-01-01

## 2018-04-05 ENCOUNTER — APPOINTMENT (OUTPATIENT)
Dept: ORTHOPEDIC SURGERY | Facility: CLINIC | Age: 63
End: 2018-04-05
Payer: COMMERCIAL

## 2018-04-05 VITALS
HEART RATE: 90 BPM | DIASTOLIC BLOOD PRESSURE: 68 MMHG | SYSTOLIC BLOOD PRESSURE: 110 MMHG | HEIGHT: 59 IN | WEIGHT: 120 LBS | BODY MASS INDEX: 24.19 KG/M2

## 2018-04-05 PROCEDURE — 99024 POSTOP FOLLOW-UP VISIT: CPT

## 2018-04-05 RX ORDER — HYALURONATE SODIUM 20 MG/2 ML
20 SYRINGE (ML) INTRAARTICULAR
Qty: 1 | Refills: 0 | Status: ACTIVE | OUTPATIENT
Start: 2018-04-05

## 2018-04-30 ENCOUNTER — APPOINTMENT (OUTPATIENT)
Dept: ORTHOPEDIC SURGERY | Facility: CLINIC | Age: 63
End: 2018-04-30
Payer: COMMERCIAL

## 2018-04-30 VITALS
DIASTOLIC BLOOD PRESSURE: 77 MMHG | SYSTOLIC BLOOD PRESSURE: 123 MMHG | HEART RATE: 75 BPM | BODY MASS INDEX: 24.19 KG/M2 | HEIGHT: 59 IN | WEIGHT: 120 LBS

## 2018-04-30 PROCEDURE — 20610 DRAIN/INJ JOINT/BURSA W/O US: CPT | Mod: RT

## 2018-05-07 ENCOUNTER — APPOINTMENT (OUTPATIENT)
Dept: ORTHOPEDIC SURGERY | Facility: CLINIC | Age: 63
End: 2018-05-07
Payer: COMMERCIAL

## 2018-05-07 VITALS
DIASTOLIC BLOOD PRESSURE: 74 MMHG | WEIGHT: 120 LBS | HEIGHT: 59 IN | SYSTOLIC BLOOD PRESSURE: 128 MMHG | HEART RATE: 62 BPM | BODY MASS INDEX: 24.19 KG/M2

## 2018-05-07 PROCEDURE — 20610 DRAIN/INJ JOINT/BURSA W/O US: CPT | Mod: RT

## 2018-05-14 ENCOUNTER — APPOINTMENT (OUTPATIENT)
Dept: ORTHOPEDIC SURGERY | Facility: CLINIC | Age: 63
End: 2018-05-14
Payer: COMMERCIAL

## 2018-05-14 VITALS
SYSTOLIC BLOOD PRESSURE: 115 MMHG | BODY MASS INDEX: 24.19 KG/M2 | WEIGHT: 120 LBS | HEIGHT: 59 IN | DIASTOLIC BLOOD PRESSURE: 74 MMHG | HEART RATE: 83 BPM

## 2018-05-14 PROCEDURE — 20610 DRAIN/INJ JOINT/BURSA W/O US: CPT | Mod: RT

## 2018-06-11 ENCOUNTER — APPOINTMENT (OUTPATIENT)
Dept: ORTHOPEDIC SURGERY | Facility: CLINIC | Age: 63
End: 2018-06-11
Payer: COMMERCIAL

## 2018-06-11 VITALS
HEART RATE: 71 BPM | WEIGHT: 120 LBS | BODY MASS INDEX: 24.19 KG/M2 | SYSTOLIC BLOOD PRESSURE: 111 MMHG | DIASTOLIC BLOOD PRESSURE: 72 MMHG | HEIGHT: 59 IN

## 2018-06-11 PROCEDURE — 99213 OFFICE O/P EST LOW 20 MIN: CPT

## 2018-06-11 PROCEDURE — 73564 X-RAY EXAM KNEE 4 OR MORE: CPT | Mod: 50

## 2018-06-11 RX ORDER — DICLOFENAC SODIUM 75 MG/1
75 TABLET, DELAYED RELEASE ORAL
Qty: 60 | Refills: 2 | Status: ACTIVE | COMMUNITY
Start: 2018-06-11 | End: 1900-01-01

## 2018-07-02 ENCOUNTER — APPOINTMENT (OUTPATIENT)
Dept: ORTHOPEDIC SURGERY | Facility: CLINIC | Age: 63
End: 2018-07-02
Payer: COMMERCIAL

## 2018-07-02 VITALS — SYSTOLIC BLOOD PRESSURE: 117 MMHG | DIASTOLIC BLOOD PRESSURE: 75 MMHG | HEART RATE: 76 BPM

## 2018-07-02 PROCEDURE — 99213 OFFICE O/P EST LOW 20 MIN: CPT

## 2018-07-12 ENCOUNTER — EMERGENCY (EMERGENCY)
Facility: HOSPITAL | Age: 63
LOS: 1 days | Discharge: HOME | End: 2018-07-12
Attending: EMERGENCY MEDICINE | Admitting: EMERGENCY MEDICINE
Payer: COMMERCIAL

## 2018-07-12 VITALS
DIASTOLIC BLOOD PRESSURE: 62 MMHG | TEMPERATURE: 98 F | HEART RATE: 62 BPM | SYSTOLIC BLOOD PRESSURE: 124 MMHG | OXYGEN SATURATION: 100 % | RESPIRATION RATE: 17 BRPM

## 2018-07-12 VITALS
SYSTOLIC BLOOD PRESSURE: 100 MMHG | HEART RATE: 82 BPM | OXYGEN SATURATION: 100 % | DIASTOLIC BLOOD PRESSURE: 65 MMHG | TEMPERATURE: 98 F | RESPIRATION RATE: 18 BRPM

## 2018-07-12 DIAGNOSIS — Z98.890 OTHER SPECIFIED POSTPROCEDURAL STATES: Chronic | ICD-10-CM

## 2018-07-12 LAB
ALBUMIN SERPL ELPH-MCNC: 4.1 G/DL — SIGNIFICANT CHANGE UP (ref 3.3–5)
ALP SERPL-CCNC: 98 U/L — SIGNIFICANT CHANGE UP (ref 40–120)
ALT FLD-CCNC: 20 U/L — SIGNIFICANT CHANGE UP (ref 4–33)
AST SERPL-CCNC: 20 U/L — SIGNIFICANT CHANGE UP (ref 4–32)
BASE EXCESS BLDV CALC-SCNC: 4.7 MMOL/L — SIGNIFICANT CHANGE UP
BASOPHILS # BLD AUTO: 0.09 K/UL — SIGNIFICANT CHANGE UP (ref 0–0.2)
BASOPHILS NFR BLD AUTO: 1.8 % — SIGNIFICANT CHANGE UP (ref 0–2)
BILIRUB SERPL-MCNC: 0.4 MG/DL — SIGNIFICANT CHANGE UP (ref 0.2–1.2)
BLOOD GAS VENOUS - CREATININE: 0.49 MG/DL — LOW (ref 0.5–1.3)
BUN SERPL-MCNC: 16 MG/DL — SIGNIFICANT CHANGE UP (ref 7–23)
CALCIUM SERPL-MCNC: 9.4 MG/DL — SIGNIFICANT CHANGE UP (ref 8.4–10.5)
CHLORIDE BLDV-SCNC: 107 MMOL/L — SIGNIFICANT CHANGE UP (ref 96–108)
CHLORIDE SERPL-SCNC: 103 MMOL/L — SIGNIFICANT CHANGE UP (ref 98–107)
CO2 SERPL-SCNC: 27 MMOL/L — SIGNIFICANT CHANGE UP (ref 22–31)
CREAT SERPL-MCNC: 0.56 MG/DL — SIGNIFICANT CHANGE UP (ref 0.5–1.3)
EOSINOPHIL # BLD AUTO: 0.45 K/UL — SIGNIFICANT CHANGE UP (ref 0–0.5)
EOSINOPHIL NFR BLD AUTO: 8.9 % — HIGH (ref 0–6)
GAS PNL BLDV: 136 MMOL/L — SIGNIFICANT CHANGE UP (ref 136–146)
GLUCOSE BLDV-MCNC: 94 — SIGNIFICANT CHANGE UP (ref 70–99)
GLUCOSE SERPL-MCNC: 99 MG/DL — SIGNIFICANT CHANGE UP (ref 70–99)
HCO3 BLDV-SCNC: 27 MMOL/L — SIGNIFICANT CHANGE UP (ref 20–27)
HCT VFR BLD CALC: 40 % — SIGNIFICANT CHANGE UP (ref 34.5–45)
HCT VFR BLDV CALC: 41.7 % — SIGNIFICANT CHANGE UP (ref 34.5–45)
HGB BLD-MCNC: 13 G/DL — SIGNIFICANT CHANGE UP (ref 11.5–15.5)
HGB BLDV-MCNC: 13.6 G/DL — SIGNIFICANT CHANGE UP (ref 11.5–15.5)
IMM GRANULOCYTES # BLD AUTO: 0 # — SIGNIFICANT CHANGE UP
IMM GRANULOCYTES NFR BLD AUTO: 0 % — SIGNIFICANT CHANGE UP (ref 0–1.5)
LACTATE BLDV-MCNC: 0.7 MMOL/L — SIGNIFICANT CHANGE UP (ref 0.5–2)
LYMPHOCYTES # BLD AUTO: 1.99 K/UL — SIGNIFICANT CHANGE UP (ref 1–3.3)
LYMPHOCYTES # BLD AUTO: 39.6 % — SIGNIFICANT CHANGE UP (ref 13–44)
MCHC RBC-ENTMCNC: 29.8 PG — SIGNIFICANT CHANGE UP (ref 27–34)
MCHC RBC-ENTMCNC: 32.5 % — SIGNIFICANT CHANGE UP (ref 32–36)
MCV RBC AUTO: 91.7 FL — SIGNIFICANT CHANGE UP (ref 80–100)
MONOCYTES # BLD AUTO: 0.29 K/UL — SIGNIFICANT CHANGE UP (ref 0–0.9)
MONOCYTES NFR BLD AUTO: 5.8 % — SIGNIFICANT CHANGE UP (ref 2–14)
NEUTROPHILS # BLD AUTO: 2.21 K/UL — SIGNIFICANT CHANGE UP (ref 1.8–7.4)
NEUTROPHILS NFR BLD AUTO: 43.9 % — SIGNIFICANT CHANGE UP (ref 43–77)
NRBC # FLD: 0 — SIGNIFICANT CHANGE UP
PCO2 BLDV: 52 MMHG — HIGH (ref 41–51)
PH BLDV: 7.38 PH — SIGNIFICANT CHANGE UP (ref 7.32–7.43)
PLATELET # BLD AUTO: 324 K/UL — SIGNIFICANT CHANGE UP (ref 150–400)
PMV BLD: 9.5 FL — SIGNIFICANT CHANGE UP (ref 7–13)
PO2 BLDV: 32 MMHG — LOW (ref 35–40)
POTASSIUM BLDV-SCNC: 4.1 MMOL/L — SIGNIFICANT CHANGE UP (ref 3.4–4.5)
POTASSIUM SERPL-MCNC: 4.6 MMOL/L — SIGNIFICANT CHANGE UP (ref 3.5–5.3)
POTASSIUM SERPL-SCNC: 4.6 MMOL/L — SIGNIFICANT CHANGE UP (ref 3.5–5.3)
PROT SERPL-MCNC: 7 G/DL — SIGNIFICANT CHANGE UP (ref 6–8.3)
RBC # BLD: 4.36 M/UL — SIGNIFICANT CHANGE UP (ref 3.8–5.2)
RBC # FLD: 13.2 % — SIGNIFICANT CHANGE UP (ref 10.3–14.5)
SAO2 % BLDV: 52 % — LOW (ref 60–85)
SODIUM SERPL-SCNC: 140 MMOL/L — SIGNIFICANT CHANGE UP (ref 135–145)
WBC # BLD: 5.03 K/UL — SIGNIFICANT CHANGE UP (ref 3.8–10.5)
WBC # FLD AUTO: 5.03 K/UL — SIGNIFICANT CHANGE UP (ref 3.8–10.5)

## 2018-07-12 PROCEDURE — 99284 EMERGENCY DEPT VISIT MOD MDM: CPT | Mod: 25

## 2018-07-12 PROCEDURE — 71046 X-RAY EXAM CHEST 2 VIEWS: CPT | Mod: 26

## 2018-07-12 PROCEDURE — 93010 ELECTROCARDIOGRAM REPORT: CPT

## 2018-07-12 RX ORDER — IPRATROPIUM/ALBUTEROL SULFATE 18-103MCG
3 AEROSOL WITH ADAPTER (GRAM) INHALATION ONCE
Qty: 0 | Refills: 0 | Status: COMPLETED | OUTPATIENT
Start: 2018-07-12 | End: 2018-07-12

## 2018-07-12 RX ORDER — AZITHROMYCIN 500 MG/1
1 TABLET, FILM COATED ORAL
Qty: 4 | Refills: 0
Start: 2018-07-12 | End: 2018-07-15

## 2018-07-12 RX ORDER — ALBUTEROL 90 UG/1
2.5 AEROSOL, METERED ORAL ONCE
Qty: 0 | Refills: 0 | Status: COMPLETED | OUTPATIENT
Start: 2018-07-12 | End: 2018-07-12

## 2018-07-12 RX ORDER — AZITHROMYCIN 500 MG/1
500 TABLET, FILM COATED ORAL ONCE
Qty: 0 | Refills: 0 | Status: COMPLETED | OUTPATIENT
Start: 2018-07-12 | End: 2018-07-12

## 2018-07-12 RX ADMIN — ALBUTEROL 2.5 MILLIGRAM(S): 90 AEROSOL, METERED ORAL at 12:27

## 2018-07-12 RX ADMIN — Medication 50 MILLIGRAM(S): at 12:34

## 2018-07-12 RX ADMIN — Medication 3 MILLILITER(S): at 13:36

## 2018-07-12 RX ADMIN — AZITHROMYCIN 500 MILLIGRAM(S): 500 TABLET, FILM COATED ORAL at 12:34

## 2018-07-12 NOTE — ED PROVIDER NOTE - PROGRESS NOTE DETAILS
John Moreno: Pt admits to feeling much better, labs , xray wnl. Will dc w azithromycin. Pt ready for dc with PMD follow up. return precautions given John Moreno: Pt admits to feeling much better, lungs clear, ambulating around ed without issue. labs , xray wnl. Will dc w azithromycin and prednisone. Pt ready for dc with PMD follow up. return precautions given

## 2018-07-12 NOTE — ED PROVIDER NOTE - OBJECTIVE STATEMENT
63y F with PMHx pre diabetes and high cholesterol not treated with medications presents c/o cough x1.5 weeks. States she has some CP with cough. Has light brown sputum with cough. Cough worse when waking up.  No fever

## 2018-07-12 NOTE — ED PROVIDER NOTE - ENMT, MLM
Airway patent, Nasal mucosa clear. Mouth with normal mucosa. Throat has no vesicles, no oropharyngeal exudates and uvula is midline. Throat nml

## 2018-07-12 NOTE — ED PROVIDER NOTE - MUSCULOSKELETAL, MLM
Spine appears normal, range of motion is not limited, no muscle or joint tenderness. Left knee well healed scar. No deformity. No ankle swelling

## 2018-07-12 NOTE — ED PROVIDER NOTE - PLAN OF CARE
Rest, drink plenty of fluids.  Advance activity as tolerated.  Continue all previously prescribed medications as directed. Take your antibiotic azithromycin once a day for 4 days.  Follow up with your primary care physician in 48-72 hours- bring copies of your results.  Return to the Emergency Department for worsening or persistent symptoms OR ANY NEW OR CONCERNING SYMPTOMS.

## 2018-07-12 NOTE — ED PROVIDER NOTE - CARE PLAN
Principal Discharge DX:	URI (upper respiratory infection)  Assessment and plan of treatment:	Rest, drink plenty of fluids.  Advance activity as tolerated.  Continue all previously prescribed medications as directed. Take your antibiotic azithromycin once a day for 4 days.  Follow up with your primary care physician in 48-72 hours- bring copies of your results.  Return to the Emergency Department for worsening or persistent symptoms OR ANY NEW OR CONCERNING SYMPTOMS.

## 2018-10-03 NOTE — ASU PATIENT PROFILE, ADULT - NS TRANSFER PROSTHESIS:
PT DAILY TREATMENT NOTE 2-15    Patient Name: Toño Poll  Date:2017  : 1988  [x]  Patient  Verified  Payor: [address] / Plan: [address] [facility] / Product Type: Managed Care Medicaid /    In time:9:35 AM  Out time: 10:10 AM  Total Treatment Time (min): 35 (35 timed)  Visit #: 4    Treatment Area: Neck pain [M54.2]  Bilateral shoulder pain [M25.511, M25.512]    SUBJECTIVE  Pain Level (0-10 scale): \"tight\"/10  Any medication changes, allergies to medications, adverse drug reactions, diagnosis change, or new procedure performed?: [x] No    [] Yes (see summary sheet for update)  Subjective functional status/changes:   [] No changes reported  Pt reports \"I feel tight and have pain everywhere. My legs even hurt today and I feel tight up here\" [pt points to chest]  She missed appointment last week \"I was just too busy with the boys finishing up with school\"  She reports she was not able to do her exercises last week.  She states 60% improvement since beginning PT.     OBJECTIVE    Modality rationale: decrease pain to improve the patients ability to perform household chores   Min Type Additional Details    [] Estim: []Att   []Unatt        []TENS instruct                  []IFC  []Premod   []NMES                     []Other:  []w/US   []w/ice   []w/heat  Position:  Location:    []  Traction: [] Cervical       []Lumbar                       [] Prone          []Supine                       []Intermittent   []Continuous Lbs:  [] before manual  [] after manual  []w/heat    []  Ultrasound: []Continuous   [] Pulsed at:                            []1MHz   []3MHz Location:  W/cm2:    []  Paraffin         Location:  []w/heat   Pt declined [x]  Ice     []  Heat  []  Ice massage Position:  Location:    []  Laser  []  Other: Position:  Location:    []  Vasopneumatic Device Pressure:       [] lo [] med [] hi   Temperature:    [x] Skin assessment post-treatment:  [x]intact []redness- no adverse reaction    []redness  adverse reaction:     35 min Therapeutic Exercise:  [x] See flow sheet : added pullies, bicep curls   Rationale: increase ROM and increase strength to improve the patients ability to perform ADL's, exercise       min Manual Therapy:     Rationale: With   [] TE   [] TA   [] neuro   [] other: Patient Education: [x] Review HEP    [] Progressed/Changed HEP based on:   [] positioning   [] body mechanics   [] transfers   [] heat/ice application    [] other:      Other Objective/Functional Measures: n/a     Pain Level (0-10 scale) post treatment: \"tight\"/10    ASSESSMENT/Changes in Function:   Patient able to perform serratus punches today and added 1# weight during flex/scap-- no reports of increased symptoms throughout therapy session. Encouraged pt to perform exercises on a daily basis to increase ROM and decrease reports of \"tightness\". Patient will continue to benefit from skilled PT services to modify and progress therapeutic interventions, address functional mobility deficits, address ROM deficits, address strength deficits, analyze and address soft tissue restrictions, analyze and cue movement patterns, analyze and modify body mechanics/ergonomics and assess and modify postural abnormalities to attain remaining goals.      [x]  See Plan of Care  []  See progress note/recertification  []  See Discharge Summary         Progress towards goals / Updated goals:  nt    PLAN  []  Upgrade activities as tolerated     [x]  Continue plan of care  []  Update interventions per flow sheet       []  Discharge due to:_  [x]  Other:_ Reassessment next visit     Cindi Boyle, PT 6/20/2017  9:37 AM Detail Level: Simple Detail Level: Zone none

## 2019-11-18 PROBLEM — M19.90 UNSPECIFIED OSTEOARTHRITIS, UNSPECIFIED SITE: Chronic | Status: ACTIVE | Noted: 2017-12-26

## 2019-11-18 PROBLEM — E11.9 TYPE 2 DIABETES MELLITUS WITHOUT COMPLICATIONS: Chronic | Status: ACTIVE | Noted: 2017-12-26

## 2019-11-25 ENCOUNTER — APPOINTMENT (OUTPATIENT)
Dept: ORTHOPEDIC SURGERY | Facility: CLINIC | Age: 64
End: 2019-11-25
Payer: COMMERCIAL

## 2019-11-25 VITALS
BODY MASS INDEX: 24.19 KG/M2 | HEART RATE: 69 BPM | DIASTOLIC BLOOD PRESSURE: 73 MMHG | SYSTOLIC BLOOD PRESSURE: 146 MMHG | HEIGHT: 59 IN | WEIGHT: 120 LBS

## 2019-11-25 DIAGNOSIS — M17.0 BILATERAL PRIMARY OSTEOARTHRITIS OF KNEE: ICD-10-CM

## 2019-11-25 DIAGNOSIS — Z96.652 PRESENCE OF LEFT ARTIFICIAL KNEE JOINT: ICD-10-CM

## 2019-11-25 PROCEDURE — 73564 X-RAY EXAM KNEE 4 OR MORE: CPT | Mod: 50

## 2019-11-25 PROCEDURE — 99214 OFFICE O/P EST MOD 30 MIN: CPT

## 2020-01-28 ENCOUNTER — OUTPATIENT (OUTPATIENT)
Dept: OUTPATIENT SERVICES | Facility: HOSPITAL | Age: 65
LOS: 1 days | End: 2020-01-28
Payer: COMMERCIAL

## 2020-01-28 VITALS
TEMPERATURE: 97 F | WEIGHT: 123.9 LBS | SYSTOLIC BLOOD PRESSURE: 120 MMHG | OXYGEN SATURATION: 98 % | HEART RATE: 68 BPM | RESPIRATION RATE: 16 BRPM | HEIGHT: 58.5 IN | DIASTOLIC BLOOD PRESSURE: 70 MMHG

## 2020-01-28 DIAGNOSIS — M17.11 UNILATERAL PRIMARY OSTEOARTHRITIS, RIGHT KNEE: ICD-10-CM

## 2020-01-28 DIAGNOSIS — J45.909 UNSPECIFIED ASTHMA, UNCOMPLICATED: ICD-10-CM

## 2020-01-28 DIAGNOSIS — Z98.890 OTHER SPECIFIED POSTPROCEDURAL STATES: Chronic | ICD-10-CM

## 2020-01-28 DIAGNOSIS — Z01.818 ENCOUNTER FOR OTHER PREPROCEDURAL EXAMINATION: ICD-10-CM

## 2020-01-28 DIAGNOSIS — E11.9 TYPE 2 DIABETES MELLITUS WITHOUT COMPLICATIONS: ICD-10-CM

## 2020-01-28 LAB
ANION GAP SERPL CALC-SCNC: 10 MMO/L — SIGNIFICANT CHANGE UP (ref 7–14)
APPEARANCE UR: SIGNIFICANT CHANGE UP
BACTERIA # UR AUTO: NEGATIVE — SIGNIFICANT CHANGE UP
BILIRUB UR-MCNC: NEGATIVE — SIGNIFICANT CHANGE UP
BLD GP AB SCN SERPL QL: NEGATIVE — SIGNIFICANT CHANGE UP
BLOOD UR QL VISUAL: NEGATIVE — SIGNIFICANT CHANGE UP
BUN SERPL-MCNC: 19 MG/DL — SIGNIFICANT CHANGE UP (ref 7–23)
CALCIUM SERPL-MCNC: 9.8 MG/DL — SIGNIFICANT CHANGE UP (ref 8.4–10.5)
CHLORIDE SERPL-SCNC: 103 MMOL/L — SIGNIFICANT CHANGE UP (ref 98–107)
CO2 SERPL-SCNC: 26 MMOL/L — SIGNIFICANT CHANGE UP (ref 22–31)
COLOR SPEC: YELLOW — SIGNIFICANT CHANGE UP
CREAT SERPL-MCNC: 0.61 MG/DL — SIGNIFICANT CHANGE UP (ref 0.5–1.3)
GLUCOSE SERPL-MCNC: 95 MG/DL — SIGNIFICANT CHANGE UP (ref 70–99)
GLUCOSE UR-MCNC: 1000 — HIGH
HBA1C BLD-MCNC: 5.6 % — SIGNIFICANT CHANGE UP (ref 4–5.6)
HCT VFR BLD CALC: 42 % — SIGNIFICANT CHANGE UP (ref 34.5–45)
HGB BLD-MCNC: 13.7 G/DL — SIGNIFICANT CHANGE UP (ref 11.5–15.5)
KETONES UR-MCNC: NEGATIVE — SIGNIFICANT CHANGE UP
LEUKOCYTE ESTERASE UR-ACNC: SIGNIFICANT CHANGE UP
MCHC RBC-ENTMCNC: 31.1 PG — SIGNIFICANT CHANGE UP (ref 27–34)
MCHC RBC-ENTMCNC: 32.6 % — SIGNIFICANT CHANGE UP (ref 32–36)
MCV RBC AUTO: 95.5 FL — SIGNIFICANT CHANGE UP (ref 80–100)
MUCOUS THREADS # UR AUTO: SIGNIFICANT CHANGE UP
NITRITE UR-MCNC: NEGATIVE — SIGNIFICANT CHANGE UP
NRBC # FLD: 0 K/UL — SIGNIFICANT CHANGE UP (ref 0–0)
PH UR: 6 — SIGNIFICANT CHANGE UP (ref 5–8)
PLATELET # BLD AUTO: 290 K/UL — SIGNIFICANT CHANGE UP (ref 150–400)
PMV BLD: 9.9 FL — SIGNIFICANT CHANGE UP (ref 7–13)
POTASSIUM SERPL-MCNC: 4.4 MMOL/L — SIGNIFICANT CHANGE UP (ref 3.5–5.3)
POTASSIUM SERPL-SCNC: 4.4 MMOL/L — SIGNIFICANT CHANGE UP (ref 3.5–5.3)
PROT UR-MCNC: 30 — SIGNIFICANT CHANGE UP
RBC # BLD: 4.4 M/UL — SIGNIFICANT CHANGE UP (ref 3.8–5.2)
RBC # FLD: 12.2 % — SIGNIFICANT CHANGE UP (ref 10.3–14.5)
RBC CASTS # UR COMP ASSIST: SIGNIFICANT CHANGE UP (ref 0–?)
RH IG SCN BLD-IMP: POSITIVE — SIGNIFICANT CHANGE UP
SODIUM SERPL-SCNC: 139 MMOL/L — SIGNIFICANT CHANGE UP (ref 135–145)
SP GR SPEC: 1.04 — SIGNIFICANT CHANGE UP (ref 1–1.04)
SQUAMOUS # UR AUTO: SIGNIFICANT CHANGE UP
UROBILINOGEN FLD QL: NORMAL — SIGNIFICANT CHANGE UP
WBC # BLD: 5.36 K/UL — SIGNIFICANT CHANGE UP (ref 3.8–10.5)
WBC # FLD AUTO: 5.36 K/UL — SIGNIFICANT CHANGE UP (ref 3.8–10.5)
WBC UR QL: HIGH (ref 0–?)

## 2020-01-28 PROCEDURE — 93010 ELECTROCARDIOGRAM REPORT: CPT

## 2020-01-28 RX ORDER — CANAGLIFLOZIN AND METFORMIN HYDROCHLORIDE 50; 500 MG/1; MG/1
1 TABLET, FILM COATED, EXTENDED RELEASE ORAL
Qty: 0 | Refills: 0 | DISCHARGE

## 2020-01-28 RX ORDER — SODIUM CHLORIDE 9 MG/ML
3 INJECTION INTRAMUSCULAR; INTRAVENOUS; SUBCUTANEOUS EVERY 8 HOURS
Refills: 0 | Status: DISCONTINUED | OUTPATIENT
Start: 2020-02-14 | End: 2020-02-17

## 2020-01-28 NOTE — H&P PST ADULT - RS GEN PE MLT RESP DETAILS PC
breath sounds equal/respirations non-labored/clear to auscultation bilaterally breath sounds equal/wheezes/respirations non-labored

## 2020-01-28 NOTE — H&P PST ADULT - HISTORY OF PRESENT ILLNESS
65 y/o female presents for preop eval for scheduled right total knee replacement on 2/14/2020.  Pt with c/o right knee pain for several years that has progressively worsened.  Recent imaging study done and revealed bone on bone.

## 2020-01-28 NOTE — H&P PST ADULT - ENDOCRINE COMMENTS
DM2, no home glucose monitoring done DM2, no home glucose monitoring done, noncompliant with medication regime

## 2020-01-28 NOTE — H&P PST ADULT - NEGATIVE ENMT SYMPTOMS
no vertigo/no tinnitus/no sinus symptoms/no hearing difficulty/no nasal congestion/no dysphagia/no ear pain

## 2020-01-28 NOTE — H&P PST ADULT - NEGATIVE NEUROLOGICAL SYMPTOMS
no vertigo/no loss of sensation/no transient paralysis/no weakness/no generalized seizures/no paresthesias

## 2020-01-28 NOTE — H&P PST ADULT - NSICDXPROBLEM_GEN_ALL_CORE_FT
PROBLEM DIAGNOSES  Problem: Asthma  Assessment and Plan: pt noncompliant with advair, positive wheezing on ascultation  medical eval requested  pending compy of report     Problem: Diabetes mellitus, type II  Assessment and Plan: Fs on admit  Pt  instructed  last dose invokamet on morning of 2/13/2020      Problem: Unilateral primary osteoarthritis, right knee  Assessment and Plan: scheduled for right total knee replacement on 2/14/2020  Written & verbal preop instructions, gi prophylaxis & surgical soap given  Pt verbalized good understanding.  Teach back done on surgical soap instructions.

## 2020-01-28 NOTE — H&P PST ADULT - NEGATIVE MUSCULOSKELETAL SYMPTOMS
no muscle weakness/no neck pain/no muscle cramps/no arm pain L/no back pain/no stiffness/no arm pain R/no joint swelling/no myalgia/no arthralgia

## 2020-01-28 NOTE — H&P PST ADULT - NSICDXPASTSURGICALHX_GEN_ALL_CORE_FT
PAST SURGICAL HISTORY:  H/O arthroplasty left knee 2017    History of ovarian cystectomy left 1997, removed left ovary

## 2020-01-28 NOTE — H&P PST ADULT - RESPIRATORY AND THORAX COMMENTS
denies recent asthma attack, pt states had a cold and use her Advair approx 2 weeks ago preventatively, denies recent asthma attack, pt states had a cold and use her Advair approx 2 weeks ago preventatively, noncompliant with Advair

## 2020-01-28 NOTE — H&P PST ADULT - NSICDXFAMILYHX_GEN_ALL_CORE_FT
FAMILY HISTORY:  Mother  Still living? No  Family history of diabetes mellitus type II, Age at diagnosis: Age Unknown

## 2020-01-28 NOTE — H&P PST ADULT - NSICDXPASTMEDICALHX_GEN_ALL_CORE_FT
PAST MEDICAL HISTORY:  Asthma     DM2 (diabetes mellitus, type 2)     Osteoarthritis     Unilateral primary osteoarthritis, right knee

## 2020-01-29 LAB
SPECIMEN SOURCE: SIGNIFICANT CHANGE UP
SPECIMEN SOURCE: SIGNIFICANT CHANGE UP

## 2020-01-30 LAB — BACTERIA UR CULT: SIGNIFICANT CHANGE UP

## 2020-01-31 LAB — BACTERIA NPH CULT: SIGNIFICANT CHANGE UP

## 2020-02-03 ENCOUNTER — OUTPATIENT (OUTPATIENT)
Dept: OUTPATIENT SERVICES | Facility: HOSPITAL | Age: 65
LOS: 1 days | End: 2020-02-03

## 2020-02-03 DIAGNOSIS — Z98.890 OTHER SPECIFIED POSTPROCEDURAL STATES: Chronic | ICD-10-CM

## 2020-02-03 PROBLEM — J45.909 UNSPECIFIED ASTHMA, UNCOMPLICATED: Chronic | Status: ACTIVE | Noted: 2017-12-26

## 2020-02-03 PROBLEM — M17.11 UNILATERAL PRIMARY OSTEOARTHRITIS, RIGHT KNEE: Chronic | Status: ACTIVE | Noted: 2020-01-28

## 2020-02-03 LAB
APPEARANCE UR: CLEAR — SIGNIFICANT CHANGE UP
BACTERIA # UR AUTO: NEGATIVE — SIGNIFICANT CHANGE UP
BILIRUB UR-MCNC: NEGATIVE — SIGNIFICANT CHANGE UP
BLOOD UR QL VISUAL: NEGATIVE — SIGNIFICANT CHANGE UP
COLOR SPEC: YELLOW — SIGNIFICANT CHANGE UP
GLUCOSE UR-MCNC: 200 — HIGH
HYALINE CASTS # UR AUTO: NEGATIVE — SIGNIFICANT CHANGE UP
KETONES UR-MCNC: NEGATIVE — SIGNIFICANT CHANGE UP
LEUKOCYTE ESTERASE UR-ACNC: SIGNIFICANT CHANGE UP
NITRITE UR-MCNC: NEGATIVE — SIGNIFICANT CHANGE UP
PH UR: 6 — SIGNIFICANT CHANGE UP (ref 5–8)
PROT UR-MCNC: NEGATIVE — SIGNIFICANT CHANGE UP
RBC CASTS # UR COMP ASSIST: SIGNIFICANT CHANGE UP (ref 0–?)
SP GR SPEC: 1.02 — SIGNIFICANT CHANGE UP (ref 1–1.04)
SQUAMOUS # UR AUTO: SIGNIFICANT CHANGE UP
UROBILINOGEN FLD QL: NORMAL — SIGNIFICANT CHANGE UP
WBC UR QL: SIGNIFICANT CHANGE UP (ref 0–?)

## 2020-02-04 LAB
BACTERIA UR CULT: SIGNIFICANT CHANGE UP
SPECIMEN SOURCE: SIGNIFICANT CHANGE UP

## 2020-02-13 ENCOUNTER — TRANSCRIPTION ENCOUNTER (OUTPATIENT)
Age: 65
End: 2020-02-13

## 2020-02-13 NOTE — ASU PATIENT PROFILE, ADULT - PMH
Asthma    DM2 (diabetes mellitus, type 2)    Osteoarthritis    Unilateral primary osteoarthritis, right knee

## 2020-02-14 ENCOUNTER — RESULT REVIEW (OUTPATIENT)
Age: 65
End: 2020-02-14

## 2020-02-14 ENCOUNTER — APPOINTMENT (OUTPATIENT)
Dept: ORTHOPEDIC SURGERY | Facility: HOSPITAL | Age: 65
End: 2020-02-14

## 2020-02-14 ENCOUNTER — INPATIENT (INPATIENT)
Facility: HOSPITAL | Age: 65
LOS: 2 days | Discharge: HOME HEALTH | End: 2020-02-17
Attending: ORTHOPAEDIC SURGERY | Admitting: ORTHOPAEDIC SURGERY
Payer: COMMERCIAL

## 2020-02-14 VITALS
HEIGHT: 58.5 IN | OXYGEN SATURATION: 97 % | DIASTOLIC BLOOD PRESSURE: 62 MMHG | WEIGHT: 123.9 LBS | HEART RATE: 78 BPM | RESPIRATION RATE: 16 BRPM | SYSTOLIC BLOOD PRESSURE: 121 MMHG | TEMPERATURE: 99 F

## 2020-02-14 DIAGNOSIS — M17.11 UNILATERAL PRIMARY OSTEOARTHRITIS, RIGHT KNEE: ICD-10-CM

## 2020-02-14 DIAGNOSIS — Z98.890 OTHER SPECIFIED POSTPROCEDURAL STATES: Chronic | ICD-10-CM

## 2020-02-14 LAB
ANION GAP SERPL CALC-SCNC: 10 MMO/L — SIGNIFICANT CHANGE UP (ref 7–14)
BUN SERPL-MCNC: 13 MG/DL — SIGNIFICANT CHANGE UP (ref 7–23)
CALCIUM SERPL-MCNC: 8.7 MG/DL — SIGNIFICANT CHANGE UP (ref 8.4–10.5)
CHLORIDE SERPL-SCNC: 108 MMOL/L — HIGH (ref 98–107)
CO2 SERPL-SCNC: 24 MMOL/L — SIGNIFICANT CHANGE UP (ref 22–31)
CREAT SERPL-MCNC: 0.55 MG/DL — SIGNIFICANT CHANGE UP (ref 0.5–1.3)
GLUCOSE BLDC GLUCOMTR-MCNC: 116 MG/DL — HIGH (ref 70–99)
GLUCOSE BLDC GLUCOMTR-MCNC: 118 MG/DL — HIGH (ref 70–99)
GLUCOSE BLDC GLUCOMTR-MCNC: 83 MG/DL — SIGNIFICANT CHANGE UP (ref 70–99)
GLUCOSE SERPL-MCNC: 91 MG/DL — SIGNIFICANT CHANGE UP (ref 70–99)
HCT VFR BLD CALC: 34.5 % — SIGNIFICANT CHANGE UP (ref 34.5–45)
HGB BLD-MCNC: 11.1 G/DL — LOW (ref 11.5–15.5)
MCHC RBC-ENTMCNC: 30.6 PG — SIGNIFICANT CHANGE UP (ref 27–34)
MCHC RBC-ENTMCNC: 32.2 % — SIGNIFICANT CHANGE UP (ref 32–36)
MCV RBC AUTO: 95 FL — SIGNIFICANT CHANGE UP (ref 80–100)
NRBC # FLD: 0 K/UL — SIGNIFICANT CHANGE UP (ref 0–0)
PLATELET # BLD AUTO: 265 K/UL — SIGNIFICANT CHANGE UP (ref 150–400)
PMV BLD: 9.2 FL — SIGNIFICANT CHANGE UP (ref 7–13)
POTASSIUM SERPL-MCNC: 4.4 MMOL/L — SIGNIFICANT CHANGE UP (ref 3.5–5.3)
POTASSIUM SERPL-SCNC: 4.4 MMOL/L — SIGNIFICANT CHANGE UP (ref 3.5–5.3)
RBC # BLD: 3.63 M/UL — LOW (ref 3.8–5.2)
RBC # FLD: 12.7 % — SIGNIFICANT CHANGE UP (ref 10.3–14.5)
SODIUM SERPL-SCNC: 142 MMOL/L — SIGNIFICANT CHANGE UP (ref 135–145)
WBC # BLD: 7.08 K/UL — SIGNIFICANT CHANGE UP (ref 3.8–10.5)
WBC # FLD AUTO: 7.08 K/UL — SIGNIFICANT CHANGE UP (ref 3.8–10.5)

## 2020-02-14 PROCEDURE — 73560 X-RAY EXAM OF KNEE 1 OR 2: CPT | Mod: 26,RT

## 2020-02-14 PROCEDURE — 88311 DECALCIFY TISSUE: CPT | Mod: 26

## 2020-02-14 PROCEDURE — 27447 TOTAL KNEE ARTHROPLASTY: CPT | Mod: RT

## 2020-02-14 PROCEDURE — 88305 TISSUE EXAM BY PATHOLOGIST: CPT | Mod: 26

## 2020-02-14 RX ORDER — FLUTICASONE PROPIONATE AND SALMETEROL 50; 250 UG/1; UG/1
2 POWDER ORAL; RESPIRATORY (INHALATION)
Qty: 0 | Refills: 0 | DISCHARGE

## 2020-02-14 RX ORDER — ACETAMINOPHEN 500 MG
975 TABLET ORAL EVERY 8 HOURS
Refills: 0 | Status: DISCONTINUED | OUTPATIENT
Start: 2020-02-15 | End: 2020-02-17

## 2020-02-14 RX ORDER — SODIUM CHLORIDE 9 MG/ML
1000 INJECTION, SOLUTION INTRAVENOUS
Refills: 0 | Status: DISCONTINUED | OUTPATIENT
Start: 2020-02-14 | End: 2020-02-16

## 2020-02-14 RX ORDER — ACETAMINOPHEN 500 MG
975 TABLET ORAL ONCE
Refills: 0 | Status: COMPLETED | OUTPATIENT
Start: 2020-02-14 | End: 2020-02-14

## 2020-02-14 RX ORDER — ACETAMINOPHEN 500 MG
1000 TABLET ORAL ONCE
Refills: 0 | Status: COMPLETED | OUTPATIENT
Start: 2020-02-14 | End: 2020-02-14

## 2020-02-14 RX ORDER — GABAPENTIN 400 MG/1
300 CAPSULE ORAL ONCE
Refills: 0 | Status: COMPLETED | OUTPATIENT
Start: 2020-02-14 | End: 2020-02-14

## 2020-02-14 RX ORDER — INSULIN LISPRO 100/ML
VIAL (ML) SUBCUTANEOUS
Refills: 0 | Status: DISCONTINUED | OUTPATIENT
Start: 2020-02-14 | End: 2020-02-17

## 2020-02-14 RX ORDER — ASPIRIN/CALCIUM CARB/MAGNESIUM 324 MG
81 TABLET ORAL
Refills: 0 | Status: DISCONTINUED | OUTPATIENT
Start: 2020-02-14 | End: 2020-02-17

## 2020-02-14 RX ORDER — SENNA PLUS 8.6 MG/1
2 TABLET ORAL AT BEDTIME
Refills: 0 | Status: DISCONTINUED | OUTPATIENT
Start: 2020-02-14 | End: 2020-02-17

## 2020-02-14 RX ORDER — CANAGLIFLOZIN AND METFORMIN HYDROCHLORIDE 50; 500 MG/1; MG/1
1 TABLET, FILM COATED, EXTENDED RELEASE ORAL
Qty: 0 | Refills: 0 | DISCHARGE

## 2020-02-14 RX ORDER — DEXTROSE 50 % IN WATER 50 %
25 SYRINGE (ML) INTRAVENOUS ONCE
Refills: 0 | Status: DISCONTINUED | OUTPATIENT
Start: 2020-02-14 | End: 2020-02-17

## 2020-02-14 RX ORDER — HYDROMORPHONE HYDROCHLORIDE 2 MG/ML
1 INJECTION INTRAMUSCULAR; INTRAVENOUS; SUBCUTANEOUS
Refills: 0 | Status: DISCONTINUED | OUTPATIENT
Start: 2020-02-14 | End: 2020-02-17

## 2020-02-14 RX ORDER — PANTOPRAZOLE SODIUM 20 MG/1
40 TABLET, DELAYED RELEASE ORAL ONCE
Refills: 0 | Status: COMPLETED | OUTPATIENT
Start: 2020-02-14 | End: 2020-02-14

## 2020-02-14 RX ORDER — TRAMADOL HYDROCHLORIDE 50 MG/1
50 TABLET ORAL ONCE
Refills: 0 | Status: DISCONTINUED | OUTPATIENT
Start: 2020-02-14 | End: 2020-02-14

## 2020-02-14 RX ORDER — MAGNESIUM HYDROXIDE 400 MG/1
30 TABLET, CHEWABLE ORAL DAILY
Refills: 0 | Status: DISCONTINUED | OUTPATIENT
Start: 2020-02-14 | End: 2020-02-17

## 2020-02-14 RX ORDER — SODIUM CHLORIDE 9 MG/ML
500 INJECTION INTRAMUSCULAR; INTRAVENOUS; SUBCUTANEOUS ONCE
Refills: 0 | Status: COMPLETED | OUTPATIENT
Start: 2020-02-14 | End: 2020-02-14

## 2020-02-14 RX ORDER — HYDROMORPHONE HYDROCHLORIDE 2 MG/ML
0.5 INJECTION INTRAMUSCULAR; INTRAVENOUS; SUBCUTANEOUS
Refills: 0 | Status: DISCONTINUED | OUTPATIENT
Start: 2020-02-14 | End: 2020-02-17

## 2020-02-14 RX ORDER — TRAMADOL HYDROCHLORIDE 50 MG/1
50 TABLET ORAL EVERY 6 HOURS
Refills: 0 | Status: DISCONTINUED | OUTPATIENT
Start: 2020-02-14 | End: 2020-02-17

## 2020-02-14 RX ORDER — SODIUM CHLORIDE 9 MG/ML
1000 INJECTION, SOLUTION INTRAVENOUS
Refills: 0 | Status: DISCONTINUED | OUTPATIENT
Start: 2020-02-14 | End: 2020-02-14

## 2020-02-14 RX ORDER — ONDANSETRON 8 MG/1
4 TABLET, FILM COATED ORAL EVERY 6 HOURS
Refills: 0 | Status: DISCONTINUED | OUTPATIENT
Start: 2020-02-14 | End: 2020-02-17

## 2020-02-14 RX ORDER — CEFAZOLIN SODIUM 1 G
2000 VIAL (EA) INJECTION EVERY 8 HOURS
Refills: 0 | Status: COMPLETED | OUTPATIENT
Start: 2020-02-14 | End: 2020-02-15

## 2020-02-14 RX ORDER — DEXTROSE 50 % IN WATER 50 %
12.5 SYRINGE (ML) INTRAVENOUS ONCE
Refills: 0 | Status: DISCONTINUED | OUTPATIENT
Start: 2020-02-14 | End: 2020-02-17

## 2020-02-14 RX ORDER — POLYETHYLENE GLYCOL 3350 17 G/17G
17 POWDER, FOR SOLUTION ORAL DAILY
Refills: 0 | Status: DISCONTINUED | OUTPATIENT
Start: 2020-02-14 | End: 2020-02-17

## 2020-02-14 RX ORDER — MEPERIDINE HYDROCHLORIDE 50 MG/ML
12.5 INJECTION INTRAMUSCULAR; INTRAVENOUS; SUBCUTANEOUS
Refills: 0 | Status: DISCONTINUED | OUTPATIENT
Start: 2020-02-14 | End: 2020-02-17

## 2020-02-14 RX ORDER — PANTOPRAZOLE SODIUM 20 MG/1
40 TABLET, DELAYED RELEASE ORAL
Refills: 0 | Status: DISCONTINUED | OUTPATIENT
Start: 2020-02-14 | End: 2020-02-17

## 2020-02-14 RX ORDER — SODIUM CHLORIDE 9 MG/ML
1000 INJECTION, SOLUTION INTRAVENOUS
Refills: 0 | Status: DISCONTINUED | OUTPATIENT
Start: 2020-02-14 | End: 2020-02-17

## 2020-02-14 RX ORDER — SODIUM CHLORIDE 9 MG/ML
500 INJECTION INTRAMUSCULAR; INTRAVENOUS; SUBCUTANEOUS ONCE
Refills: 0 | Status: DISCONTINUED | OUTPATIENT
Start: 2020-02-14 | End: 2020-02-17

## 2020-02-14 RX ORDER — BUDESONIDE AND FORMOTEROL FUMARATE DIHYDRATE 160; 4.5 UG/1; UG/1
2 AEROSOL RESPIRATORY (INHALATION)
Refills: 0 | Status: DISCONTINUED | OUTPATIENT
Start: 2020-02-14 | End: 2020-02-17

## 2020-02-14 RX ORDER — SODIUM CHLORIDE 9 MG/ML
500 INJECTION INTRAMUSCULAR; INTRAVENOUS; SUBCUTANEOUS ONCE
Refills: 0 | Status: COMPLETED | OUTPATIENT
Start: 2020-02-14

## 2020-02-14 RX ORDER — ONDANSETRON 8 MG/1
4 TABLET, FILM COATED ORAL ONCE
Refills: 0 | Status: DISCONTINUED | OUTPATIENT
Start: 2020-02-14 | End: 2020-02-17

## 2020-02-14 RX ORDER — INSULIN LISPRO 100/ML
VIAL (ML) SUBCUTANEOUS AT BEDTIME
Refills: 0 | Status: DISCONTINUED | OUTPATIENT
Start: 2020-02-14 | End: 2020-02-17

## 2020-02-14 RX ORDER — GLUCAGON INJECTION, SOLUTION 0.5 MG/.1ML
1 INJECTION, SOLUTION SUBCUTANEOUS ONCE
Refills: 0 | Status: DISCONTINUED | OUTPATIENT
Start: 2020-02-14 | End: 2020-02-17

## 2020-02-14 RX ORDER — DEXTROSE 50 % IN WATER 50 %
15 SYRINGE (ML) INTRAVENOUS ONCE
Refills: 0 | Status: DISCONTINUED | OUTPATIENT
Start: 2020-02-14 | End: 2020-02-17

## 2020-02-14 RX ORDER — KETOROLAC TROMETHAMINE 30 MG/ML
15 SYRINGE (ML) INJECTION EVERY 6 HOURS
Refills: 0 | Status: DISCONTINUED | OUTPATIENT
Start: 2020-02-14 | End: 2020-02-16

## 2020-02-14 RX ORDER — SODIUM CHLORIDE 9 MG/ML
1000 INJECTION INTRAMUSCULAR; INTRAVENOUS; SUBCUTANEOUS ONCE
Refills: 0 | Status: COMPLETED | OUTPATIENT
Start: 2020-02-15 | End: 2020-02-15

## 2020-02-14 RX ORDER — OXYCODONE HYDROCHLORIDE 5 MG/1
10 TABLET ORAL EVERY 4 HOURS
Refills: 0 | Status: DISCONTINUED | OUTPATIENT
Start: 2020-02-14 | End: 2020-02-17

## 2020-02-14 RX ORDER — ACETAMINOPHEN 500 MG
1000 TABLET ORAL ONCE
Refills: 0 | Status: COMPLETED | OUTPATIENT
Start: 2020-02-15 | End: 2020-02-15

## 2020-02-14 RX ORDER — OXYCODONE HYDROCHLORIDE 5 MG/1
5 TABLET ORAL EVERY 4 HOURS
Refills: 0 | Status: DISCONTINUED | OUTPATIENT
Start: 2020-02-14 | End: 2020-02-17

## 2020-02-14 RX ADMIN — Medication 975 MILLIGRAM(S): at 06:22

## 2020-02-14 RX ADMIN — POLYETHYLENE GLYCOL 3350 17 GRAM(S): 17 POWDER, FOR SOLUTION ORAL at 18:14

## 2020-02-14 RX ADMIN — SODIUM CHLORIDE 100 MILLILITER(S): 9 INJECTION, SOLUTION INTRAVENOUS at 21:19

## 2020-02-14 RX ADMIN — OXYCODONE HYDROCHLORIDE 10 MILLIGRAM(S): 5 TABLET ORAL at 17:05

## 2020-02-14 RX ADMIN — Medication 15 MILLIGRAM(S): at 15:21

## 2020-02-14 RX ADMIN — Medication 1000 MILLIGRAM(S): at 13:26

## 2020-02-14 RX ADMIN — Medication 100 MILLIGRAM(S): at 16:08

## 2020-02-14 RX ADMIN — SODIUM CHLORIDE 100 MILLILITER(S): 9 INJECTION, SOLUTION INTRAVENOUS at 10:25

## 2020-02-14 RX ADMIN — SODIUM CHLORIDE 1000 MILLILITER(S): 9 INJECTION INTRAMUSCULAR; INTRAVENOUS; SUBCUTANEOUS at 11:30

## 2020-02-14 RX ADMIN — OXYCODONE HYDROCHLORIDE 10 MILLIGRAM(S): 5 TABLET ORAL at 22:20

## 2020-02-14 RX ADMIN — TRAMADOL HYDROCHLORIDE 50 MILLIGRAM(S): 50 TABLET ORAL at 06:47

## 2020-02-14 RX ADMIN — PANTOPRAZOLE SODIUM 40 MILLIGRAM(S): 20 TABLET, DELAYED RELEASE ORAL at 06:20

## 2020-02-14 RX ADMIN — TRAMADOL HYDROCHLORIDE 50 MILLIGRAM(S): 50 TABLET ORAL at 06:20

## 2020-02-14 RX ADMIN — OXYCODONE HYDROCHLORIDE 10 MILLIGRAM(S): 5 TABLET ORAL at 16:07

## 2020-02-14 RX ADMIN — OXYCODONE HYDROCHLORIDE 10 MILLIGRAM(S): 5 TABLET ORAL at 21:23

## 2020-02-14 RX ADMIN — Medication 400 MILLIGRAM(S): at 12:38

## 2020-02-14 RX ADMIN — GABAPENTIN 300 MILLIGRAM(S): 400 CAPSULE ORAL at 06:19

## 2020-02-14 RX ADMIN — Medication 81 MILLIGRAM(S): at 18:14

## 2020-02-14 RX ADMIN — SODIUM CHLORIDE 3 MILLILITER(S): 9 INJECTION INTRAMUSCULAR; INTRAVENOUS; SUBCUTANEOUS at 21:18

## 2020-02-14 RX ADMIN — Medication 975 MILLIGRAM(S): at 06:47

## 2020-02-14 RX ADMIN — Medication 400 MILLIGRAM(S): at 19:29

## 2020-02-14 NOTE — PHYSICAL THERAPY INITIAL EVALUATION ADULT - GENERAL OBSERVATIONS, REHAB EVAL
Patient Patient received semi supine in bed , (+) IV, (+) right knee immobilizer, unable to perform SLR on right leg , c/o dizziness when seated /41 and with c/o R knee pain, patient refused to perform knee ROM check on R knee , MICHAEL Lin made aware.

## 2020-02-14 NOTE — PHYSICAL THERAPY INITIAL EVALUATION ADULT - DISCHARGE DISPOSITION, PT EVAL
to be assessed , patient with c/o dizziness, out of bed activities not performed
2 = assistive person

## 2020-02-14 NOTE — PHYSICAL THERAPY INITIAL EVALUATION ADULT - ACTIVE RANGE OF MOTION EXAMINATION, REHAB EVAL
bilateral upper extremity Active ROM was WFL (within functional limits)/Left LE Active ROM was WFL (within functional limits)/Pt refused ROM check on R knee due to moderate to severe pain even at rest , MICHAEL Lin made aware.

## 2020-02-14 NOTE — PROGRESS NOTE ADULT - SUBJECTIVE AND OBJECTIVE BOX
Ortho Post-Op    Patient was seen and examined at bedside. Denies CP/SOB/Dizziness. Pain is controlled. No complaints at this time.     Vital Signs Last 24 Hrs  T(C): 36.5 (14 Feb 2020 09:55), Max: 37.1 (14 Feb 2020 06:10)  T(F): 97.7 (14 Feb 2020 09:55), Max: 98.7 (14 Feb 2020 06:10)  HR: 54 (14 Feb 2020 13:00) (50 - 78)  BP: 103/49 (14 Feb 2020 13:00) (97/57 - 121/62)  BP(mean): 61 (14 Feb 2020 13:00) (61 - 76)  RR: 14 (14 Feb 2020 13:00) (12 - 20)  SpO2: 98% (14 Feb 2020 13:00) (95% - 100%)    GEN: NAD  RLE: Dressing C/D/I.  HV in place   Bilat LE: Motor intact + EHL/FHL/TA/GS. Sensation is grossly intact distal . Extremity warm. Compartments are soft. DP 1+    Labs:                          11.1   7.08  )-----------( 265      ( 14 Feb 2020 10:50 )             34.5       02-14    142  |  108<H>  |  13  ----------------------------<  91  4.4   |  24  |  0.55    Ca    8.7      14 Feb 2020 10:50        A/P: Patient is a 64y y/o Female s/p R TKA, POD # 0    -Pain control/analgesia  -Inc spirometry  -Venodynes/foot pumps  -F/U AM Labs  -PT/OT/WBAT  -Antibiotic periop-ancef  -Anticoagulation-A81mg BID  -Continue to monitor. Notify ortho with questions.

## 2020-02-15 DIAGNOSIS — D62 ACUTE POSTHEMORRHAGIC ANEMIA: ICD-10-CM

## 2020-02-15 DIAGNOSIS — I10 ESSENTIAL (PRIMARY) HYPERTENSION: ICD-10-CM

## 2020-02-15 DIAGNOSIS — Z29.9 ENCOUNTER FOR PROPHYLACTIC MEASURES, UNSPECIFIED: ICD-10-CM

## 2020-02-15 DIAGNOSIS — E78.5 HYPERLIPIDEMIA, UNSPECIFIED: ICD-10-CM

## 2020-02-15 LAB
ANION GAP SERPL CALC-SCNC: 3 MMO/L — LOW (ref 7–14)
BUN SERPL-MCNC: 8 MG/DL — SIGNIFICANT CHANGE UP (ref 7–23)
CALCIUM SERPL-MCNC: 8.6 MG/DL — SIGNIFICANT CHANGE UP (ref 8.4–10.5)
CHLORIDE SERPL-SCNC: 104 MMOL/L — SIGNIFICANT CHANGE UP (ref 98–107)
CO2 SERPL-SCNC: 30 MMOL/L — SIGNIFICANT CHANGE UP (ref 22–31)
CREAT SERPL-MCNC: 0.62 MG/DL — SIGNIFICANT CHANGE UP (ref 0.5–1.3)
GLUCOSE BLDC GLUCOMTR-MCNC: 111 MG/DL — HIGH (ref 70–99)
GLUCOSE BLDC GLUCOMTR-MCNC: 118 MG/DL — HIGH (ref 70–99)
GLUCOSE BLDC GLUCOMTR-MCNC: 129 MG/DL — HIGH (ref 70–99)
GLUCOSE BLDC GLUCOMTR-MCNC: 144 MG/DL — HIGH (ref 70–99)
GLUCOSE SERPL-MCNC: 109 MG/DL — HIGH (ref 70–99)
HCT VFR BLD CALC: 32.4 % — LOW (ref 34.5–45)
HGB BLD-MCNC: 10.6 G/DL — LOW (ref 11.5–15.5)
MCHC RBC-ENTMCNC: 31 PG — SIGNIFICANT CHANGE UP (ref 27–34)
MCHC RBC-ENTMCNC: 32.7 % — SIGNIFICANT CHANGE UP (ref 32–36)
MCV RBC AUTO: 94.7 FL — SIGNIFICANT CHANGE UP (ref 80–100)
NRBC # FLD: 0 K/UL — SIGNIFICANT CHANGE UP (ref 0–0)
PLATELET # BLD AUTO: 228 K/UL — SIGNIFICANT CHANGE UP (ref 150–400)
PMV BLD: 9.7 FL — SIGNIFICANT CHANGE UP (ref 7–13)
POTASSIUM SERPL-MCNC: 4.1 MMOL/L — SIGNIFICANT CHANGE UP (ref 3.5–5.3)
POTASSIUM SERPL-SCNC: 4.1 MMOL/L — SIGNIFICANT CHANGE UP (ref 3.5–5.3)
RBC # BLD: 3.42 M/UL — LOW (ref 3.8–5.2)
RBC # FLD: 12.9 % — SIGNIFICANT CHANGE UP (ref 10.3–14.5)
SODIUM SERPL-SCNC: 137 MMOL/L — SIGNIFICANT CHANGE UP (ref 135–145)
WBC # BLD: 6.27 K/UL — SIGNIFICANT CHANGE UP (ref 3.8–10.5)
WBC # FLD AUTO: 6.27 K/UL — SIGNIFICANT CHANGE UP (ref 3.8–10.5)

## 2020-02-15 PROCEDURE — 99223 1ST HOSP IP/OBS HIGH 75: CPT

## 2020-02-15 RX ADMIN — Medication 975 MILLIGRAM(S): at 19:24

## 2020-02-15 RX ADMIN — Medication 15 MILLIGRAM(S): at 00:16

## 2020-02-15 RX ADMIN — OXYCODONE HYDROCHLORIDE 10 MILLIGRAM(S): 5 TABLET ORAL at 09:47

## 2020-02-15 RX ADMIN — OXYCODONE HYDROCHLORIDE 10 MILLIGRAM(S): 5 TABLET ORAL at 10:45

## 2020-02-15 RX ADMIN — SODIUM CHLORIDE 3 MILLILITER(S): 9 INJECTION INTRAMUSCULAR; INTRAVENOUS; SUBCUTANEOUS at 07:04

## 2020-02-15 RX ADMIN — Medication 975 MILLIGRAM(S): at 12:32

## 2020-02-15 RX ADMIN — PANTOPRAZOLE SODIUM 40 MILLIGRAM(S): 20 TABLET, DELAYED RELEASE ORAL at 07:02

## 2020-02-15 RX ADMIN — OXYCODONE HYDROCHLORIDE 10 MILLIGRAM(S): 5 TABLET ORAL at 21:32

## 2020-02-15 RX ADMIN — Medication 81 MILLIGRAM(S): at 07:02

## 2020-02-15 RX ADMIN — Medication 15 MILLIGRAM(S): at 12:32

## 2020-02-15 RX ADMIN — Medication 81 MILLIGRAM(S): at 18:17

## 2020-02-15 RX ADMIN — BUDESONIDE AND FORMOTEROL FUMARATE DIHYDRATE 2 PUFF(S): 160; 4.5 AEROSOL RESPIRATORY (INHALATION) at 09:47

## 2020-02-15 RX ADMIN — SODIUM CHLORIDE 1000 MILLILITER(S): 9 INJECTION INTRAMUSCULAR; INTRAVENOUS; SUBCUTANEOUS at 07:07

## 2020-02-15 RX ADMIN — Medication 15 MILLIGRAM(S): at 18:17

## 2020-02-15 RX ADMIN — SODIUM CHLORIDE 3 MILLILITER(S): 9 INJECTION INTRAMUSCULAR; INTRAVENOUS; SUBCUTANEOUS at 22:50

## 2020-02-15 RX ADMIN — SODIUM CHLORIDE 3 MILLILITER(S): 9 INJECTION INTRAMUSCULAR; INTRAVENOUS; SUBCUTANEOUS at 13:40

## 2020-02-15 RX ADMIN — Medication 400 MILLIGRAM(S): at 04:31

## 2020-02-15 RX ADMIN — Medication 15 MILLIGRAM(S): at 07:02

## 2020-02-15 RX ADMIN — OXYCODONE HYDROCHLORIDE 10 MILLIGRAM(S): 5 TABLET ORAL at 22:10

## 2020-02-15 RX ADMIN — SODIUM CHLORIDE 100 MILLILITER(S): 9 INJECTION, SOLUTION INTRAVENOUS at 04:31

## 2020-02-15 RX ADMIN — Medication 100 MILLIGRAM(S): at 00:16

## 2020-02-15 RX ADMIN — SODIUM CHLORIDE 100 MILLILITER(S): 9 INJECTION, SOLUTION INTRAVENOUS at 19:24

## 2020-02-15 NOTE — OCCUPATIONAL THERAPY INITIAL EVALUATION ADULT - GENERAL OBSERVATIONS, REHAB EVAL
Patient received semisupine in bed and agreeable to participate in OT evaluation. + right LE knee immobilizer.

## 2020-02-15 NOTE — CONSULT NOTE ADULT - PROBLEM SELECTOR RECOMMENDATION 9
-s/p right TKR on 2/14/20  -postop management per orthopedics, pain control, PT, incentive spirometry, DVT ppx

## 2020-02-15 NOTE — CONSULT NOTE ADULT - PROBLEM SELECTOR RECOMMENDATION 4
-On Invokamet at home  -hold home med, humalog ISS, monitor FS  -glycemic control acceptable, A1c 5.6%  -

## 2020-02-15 NOTE — PROGRESS NOTE ADULT - SUBJECTIVE AND OBJECTIVE BOX
Orthopedic Surgery Progress Note    S: Patient seen and examined today. No acute events overnight. Pain is well controlled. Denies f/c, chest pain, sob, dizziness.    O:  Vital Signs Last 24 Hrs  T(C): 37.7 (15 Feb 2020 01:24), Max: 37.7 (15 Feb 2020 01:24)  T(F): 99.8 (15 Feb 2020 01:24), Max: 99.8 (15 Feb 2020 01:24)  HR: 74 (15 Feb 2020 01:24) (50 - 75)  BP: 97/72 (15 Feb 2020 01:24) (92/48 - 119/56)  BP(mean): 3 (14 Feb 2020 14:44) (3 - 79)  RR: 16 (15 Feb 2020 01:24) (12 - 20)  SpO2: 99% (15 Feb 2020 01:24) (94% - 100%)    Gen: NAD  RLE    Dressing clean, dry, intact  EHL/FHL/TA/GS intact  SILT DP/SP/Perkins/Sa  WWP distally                          11.1   7.08  )-----------( 265      ( 14 Feb 2020 10:50 )             34.5       02-14    142  |  108<H>  |  13  ----------------------------<  91  4.4   |  24  |  0.55            A/P 64y year old Female s/p R   TKA  Pain Control  WBAT  PT/OOB  DVT PPx  Dispo Planning

## 2020-02-15 NOTE — CONSULT NOTE ADULT - PROBLEM SELECTOR RECOMMENDATION 2
-in setting of surgery  -hgb trending down from 13.7 preop to 11.1-->10.6 postop  -cont to trend CBC, transfuse prn  -pt is hemodynamically stable

## 2020-02-15 NOTE — CONSULT NOTE ADULT - PROBLEM SELECTOR RECOMMENDATION 5
previously on Micardis, currently not on any antihypertensives preop  -BP soft likely d/t postop, blood loss, 96/53, repeat 104/46  -avoid hypotension, IVF NS bolus prn hypotension

## 2020-02-15 NOTE — OCCUPATIONAL THERAPY INITIAL EVALUATION ADULT - ANTICIPATED DISCHARGE DISPOSITION, OT EVAL
Anticipating home with no OT needs following discharge. Please continue to follow OT notes for any changes. Patient may benefit from continued PT services.

## 2020-02-15 NOTE — CONSULT NOTE ADULT - SUBJECTIVE AND OBJECTIVE BOX
POD #1 Right TKR    Comfortable    Vital Signs Last 24 Hrs  T(C): 37.7 (15 Feb 2020 01:24), Max: 37.7 (15 Feb 2020 01:24)  T(F): 99.8 (15 Feb 2020 01:24), Max: 99.8 (15 Feb 2020 01:24)  HR: 74 (15 Feb 2020 01:24) (50 - 75)  BP: 97/72 (15 Feb 2020 01:24) (92/48 - 119/56)  BP(mean): 3 (14 Feb 2020 14:44) (3 - 79)  RR: 16 (15 Feb 2020 01:24) (12 - 20)  SpO2: 99% (15 Feb 2020 01:24) (94% - 100%)    Right knee dressing intact. Calf soft  NV intact                          11.1   7.08  )-----------( 265      ( 14 Feb 2020 10:50 )             34.5     Physical therapy  Check labs  DVT prophylaxis
Rocío Bird MD  Pager [phone]    HPI:  65 y/o female with h/o HTN, DM-2, asthma, osteoarthritis b/l knees, s/p L TKR 1/2018, now admitted for right TKR on 2/14. Patient c/o right knee pain for several years that has progressively worsened.  Recent imaging study done and revealed bone on bone. Patient seen on POD1, feels OK, denies chest pain/sob/dizziness, BP soft, pain relatively controlled, reports her asthma is well controlled and only wheezes when she gets URI.       PAST MEDICAL & SURGICAL HISTORY:  Unilateral primary osteoarthritis, right knee  Osteoarthritis  Asthma  DM2 (diabetes mellitus, type 2)  H/O arthroplasty: left knee 2017  History of ovarian cystectomy: left 1997, removed left ovary      Review of Systems:   CONSTITUTIONAL: No fever, weight loss, or fatigue  EYES: No eye pain, visual disturbances, or discharge  ENMT:  No difficulty hearing, tinnitus, vertigo; No sinus or throat pain  NECK: No pain or stiffness  BREASTS: No pain, masses, or nipple discharge  RESPIRATORY: No cough, wheezing, chills or hemoptysis; No shortness of breath  CARDIOVASCULAR: No chest pain, palpitations, dizziness, or leg swelling  GASTROINTESTINAL: No abdominal or epigastric pain. No nausea, vomiting, or hematemesis; No diarrhea or constipation. No melena or hematochezia.  GENITOURINARY: No dysuria, frequency, hematuria, or incontinence  NEUROLOGICAL: No headaches, memory loss, loss of strength, numbness, or tremors  SKIN: No itching, burning, rashes, or lesions   LYMPH NODES: No enlarged glands  ENDOCRINE: No heat or cold intolerance; No hair loss  MUSCULOSKELETAL: + joint pain, right knee pain;  No muscle, back, or extremity pain  PSYCHIATRIC: No depression, anxiety, mood swings, or difficulty sleeping  HEME/LYMPH: No easy bruising, or bleeding gums  ALLERY AND IMMUNOLOGIC: No hives or eczema    Allergies    No Known Allergies    Intolerances        Social History: single, lives with 2 children  no substance abuse, denies smoking/alcohol use    FAMILY HISTORY:  Family history of diabetes mellitus type II (Mother)      Home Medications:  Advair Diskus 250 mcg-50 mcg inhalation powder: 2 puff(s) inhaled 2 times a day (14 Feb 2020 06:28)  Invokamet 150 mg-1000 mg oral tablet: 1 tab(s) orally once a day (14 Feb 2020 06:28)      MEDICATIONS  (STANDING):  acetaminophen   Tablet .. 975 milliGRAM(s) Oral every 8 hours  aspirin enteric coated 81 milliGRAM(s) Oral two times a day  budesonide 160 MICROgram(s)/formoterol 4.5 MICROgram(s) Inhaler 2 Puff(s) Inhalation two times a day  dextrose 5%. 1000 milliLiter(s) (50 mL/Hr) IV Continuous <Continuous>  dextrose 50% Injectable 12.5 Gram(s) IV Push once  dextrose 50% Injectable 25 Gram(s) IV Push once  dextrose 50% Injectable 25 Gram(s) IV Push once  insulin lispro (HumaLOG) corrective regimen sliding scale   SubCutaneous three times a day before meals  insulin lispro (HumaLOG) corrective regimen sliding scale   SubCutaneous at bedtime  ketorolac   Injectable 15 milliGRAM(s) IV Push every 6 hours  lactated ringers. 1000 milliLiter(s) (100 mL/Hr) IV Continuous <Continuous>  pantoprazole    Tablet 40 milliGRAM(s) Oral before breakfast  polyethylene glycol 3350 17 Gram(s) Oral daily  sodium chloride 0.9% Bolus 500 milliLiter(s) IV Bolus once  sodium chloride 0.9% lock flush 3 milliLiter(s) IV Push every 8 hours    MEDICATIONS  (PRN):  aluminum hydroxide/magnesium hydroxide/simethicone Suspension 30 milliLiter(s) Oral four times a day PRN Indigestion  dextrose 40% Gel 15 Gram(s) Oral once PRN Blood Glucose LESS THAN 70 milliGRAM(s)/deciliter  glucagon  Injectable 1 milliGRAM(s) IntraMuscular once PRN Glucose LESS THAN 70 milligrams/deciliter  HYDROmorphone  Injectable 0.5 milliGRAM(s) IV Push every 10 minutes PRN Moderate Pain (4 - 6)  HYDROmorphone  Injectable 1 milliGRAM(s) IV Push every 10 minutes PRN Severe Pain (7 - 10)  magnesium hydroxide Suspension 30 milliLiter(s) Oral daily PRN Constipation  meperidine     Injectable 12.5 milliGRAM(s) IV Push every 10 minutes PRN Shivering  ondansetron Injectable 4 milliGRAM(s) IV Push once PRN Nausea and/or Vomiting  ondansetron Injectable 4 milliGRAM(s) IV Push every 6 hours PRN Nausea and/or Vomiting  oxyCODONE    IR 5 milliGRAM(s) Oral every 4 hours PRN Moderate Pain (4 - 6)  oxyCODONE    IR 10 milliGRAM(s) Oral every 4 hours PRN Severe Pain (7 - 10)  senna 2 Tablet(s) Oral at bedtime PRN Constipation  traMADol 50 milliGRAM(s) Oral every 6 hours PRN Mild Pain (1 - 3)      Vital Signs Last 24 Hrs  T(C): 37.4 (15 Feb 2020 09:46), Max: 37.7 (15 Feb 2020 01:24)  T(F): 99.4 (15 Feb 2020 09:46), Max: 99.8 (15 Feb 2020 01:24)  HR: 82 (15 Feb 2020 09:46) (54 - 89)  BP: 104/46 (15 Feb 2020 09:46) (92/48 - 117/66)  BP(mean): 3 (14 Feb 2020 14:44) (3 - 79)  RR: 16 (15 Feb 2020 09:46) (12 - 18)  SpO2: 98% (15 Feb 2020 09:46) (94% - 100%)  CAPILLARY BLOOD GLUCOSE      POCT Blood Glucose.: 118 mg/dL (15 Feb 2020 11:41)  POCT Blood Glucose.: 144 mg/dL (15 Feb 2020 07:41)  POCT Blood Glucose.: 116 mg/dL (14 Feb 2020 21:56)  POCT Blood Glucose.: 118 mg/dL (14 Feb 2020 16:45)    I&O's Summary    14 Feb 2020 07:01  -  15 Feb 2020 07:00  --------------------------------------------------------  IN: 1200 mL / OUT: 950 mL / NET: 250 mL        PHYSICAL EXAM:  GENERAL: NAD, well-developed  HEAD:  Atraumatic, Normocephalic  EYES: EOMI, PERRLA, conjunctiva and sclera clear  NECK: Supple, No JVD  CHEST/LUNG: Clear to auscultation bilaterally; No wheeze  HEART: Regular rate and rhythm; No murmurs, rubs, or gallops  ABDOMEN: Soft, Nontender, Nondistended; Bowel sounds present  EXTREMITIES:  2+ Peripheral Pulses, No clubbing, cyanosis, or edema; right TKR with dressing C/D/I  PSYCH: AAOx3  NEUROLOGY: non-focal  SKIN: No rashes or lesions    LABS:                        10.6   6.27  )-----------( 228      ( 15 Feb 2020 06:45 )             32.4     02-15    137  |  104  |  8   ----------------------------<  109<H>  4.1   |  30  |  0.62    Ca    8.6      15 Feb 2020 06:45    Microbiology     RADIOLOGY & ADDITIONAL TESTS:    Imaging Personally Reviewed:  < from: Xray Knee 1 or 2 Views, Right (02.14.20 @ 09:49) >  IMPRESSION:  Unconstrained right total knee prosthesis implanted.    Intact and aligned hardware and no periprosthetic fractures.    Postoperative soft tissue changes.    Surgical skin staples overlie operative site.    Correlate with intraoperative findings.    Consultant(s) Notes Reviewed:      Care Discussed with Consultants/Other Providers:

## 2020-02-15 NOTE — CONSULT NOTE ADULT - PROBLEM SELECTOR RECOMMENDATION 3
-well controlled  -advair at home, symbicort here  -lungs clear on exam, no wheeze  -monitor lung exam and O2 sats

## 2020-02-15 NOTE — CONSULT NOTE ADULT - ASSESSMENT
65 y/o female with h/o HTN, DM-2, asthma, osteoarthritis b/l knees, s/p L TKR 1/2018, now s/p right TKR on 2/14/20

## 2020-02-16 ENCOUNTER — TRANSCRIPTION ENCOUNTER (OUTPATIENT)
Age: 65
End: 2020-02-16

## 2020-02-16 LAB
GLUCOSE BLDC GLUCOMTR-MCNC: 104 MG/DL — HIGH (ref 70–99)
GLUCOSE BLDC GLUCOMTR-MCNC: 105 MG/DL — HIGH (ref 70–99)
GLUCOSE BLDC GLUCOMTR-MCNC: 107 MG/DL — HIGH (ref 70–99)
GLUCOSE BLDC GLUCOMTR-MCNC: 136 MG/DL — HIGH (ref 70–99)

## 2020-02-16 PROCEDURE — 99233 SBSQ HOSP IP/OBS HIGH 50: CPT

## 2020-02-16 RX ADMIN — Medication 15 MILLIGRAM(S): at 00:49

## 2020-02-16 RX ADMIN — Medication 81 MILLIGRAM(S): at 05:31

## 2020-02-16 RX ADMIN — POLYETHYLENE GLYCOL 3350 17 GRAM(S): 17 POWDER, FOR SOLUTION ORAL at 12:21

## 2020-02-16 RX ADMIN — SODIUM CHLORIDE 3 MILLILITER(S): 9 INJECTION INTRAMUSCULAR; INTRAVENOUS; SUBCUTANEOUS at 05:32

## 2020-02-16 RX ADMIN — Medication 975 MILLIGRAM(S): at 04:52

## 2020-02-16 RX ADMIN — PANTOPRAZOLE SODIUM 40 MILLIGRAM(S): 20 TABLET, DELAYED RELEASE ORAL at 05:31

## 2020-02-16 RX ADMIN — OXYCODONE HYDROCHLORIDE 10 MILLIGRAM(S): 5 TABLET ORAL at 07:30

## 2020-02-16 RX ADMIN — Medication 81 MILLIGRAM(S): at 17:05

## 2020-02-16 RX ADMIN — OXYCODONE HYDROCHLORIDE 10 MILLIGRAM(S): 5 TABLET ORAL at 18:00

## 2020-02-16 RX ADMIN — SODIUM CHLORIDE 3 MILLILITER(S): 9 INJECTION INTRAMUSCULAR; INTRAVENOUS; SUBCUTANEOUS at 21:35

## 2020-02-16 RX ADMIN — SODIUM CHLORIDE 3 MILLILITER(S): 9 INJECTION INTRAMUSCULAR; INTRAVENOUS; SUBCUTANEOUS at 16:38

## 2020-02-16 RX ADMIN — OXYCODONE HYDROCHLORIDE 10 MILLIGRAM(S): 5 TABLET ORAL at 17:04

## 2020-02-16 RX ADMIN — Medication 975 MILLIGRAM(S): at 12:21

## 2020-02-16 RX ADMIN — Medication 15 MILLIGRAM(S): at 05:31

## 2020-02-16 RX ADMIN — OXYCODONE HYDROCHLORIDE 10 MILLIGRAM(S): 5 TABLET ORAL at 23:59

## 2020-02-16 RX ADMIN — Medication 975 MILLIGRAM(S): at 19:08

## 2020-02-16 RX ADMIN — OXYCODONE HYDROCHLORIDE 10 MILLIGRAM(S): 5 TABLET ORAL at 08:30

## 2020-02-16 NOTE — PROGRESS NOTE ADULT - SUBJECTIVE AND OBJECTIVE BOX
Orthopedic Surgery Progress Note    S: Patient seen and examined today. No acute events overnight. Pain is well controlled. Denies f/c, chest pain, sob, dizziness.    O:  Vital Signs Last 24 Hrs  T(C): 37.6 (16 Feb 2020 05:30), Max: 37.8 (15 Feb 2020 22:32)  T(F): 99.7 (16 Feb 2020 05:30), Max: 100 (15 Feb 2020 22:32)  HR: 88 (16 Feb 2020 05:30) (81 - 95)  BP: 126/56 (16 Feb 2020 05:30) (104/46 - 129/51)  BP(mean): --  RR: 16 (16 Feb 2020 05:30) (16 - 16)  SpO2: 98% (16 Feb 2020 05:30) (96% - 100%)    Gen: NAD  RLE    Dressing clean, dry, intact  EHL/FHL/TA/GS intact  SILT DP/SP/Perkins/Sa  WWP distally                         A/P 64y year old Female s/p R   TKA doing well this AM  Pain Control  WBAT  PT/OOB  DVT PPx  Dispo Planning

## 2020-02-16 NOTE — PROGRESS NOTE ADULT - ASSESSMENT
63 y/o female with h/o HTN, DM-2, asthma, osteoarthritis b/l knees, s/p L TKR 1/2018, now s/p right TKR on 2/14/20

## 2020-02-16 NOTE — DISCHARGE NOTE NURSING/CASE MANAGEMENT/SOCIAL WORK - NSDCPECAREGIVERED_GEN_ALL_CORE
carenotes on knee replacement, managing pain at home handout, side effects pamphlet, diabetes education with current A1C of 5.6, exercise worksheet, FORCE pamphlet

## 2020-02-16 NOTE — PROGRESS NOTE ADULT - PROBLEM SELECTOR PLAN 3
-well controlled  -advair at home, symbicort here  -lungs clear on exam, no wheeze  -monitor lung exam and O2 sats.

## 2020-02-16 NOTE — DISCHARGE NOTE NURSING/CASE MANAGEMENT/SOCIAL WORK - NSDCPNINST_GEN_ALL_CORE
call MD for any numbness or tingling, pain unrelieved by pain medications, nausea, vomiting, fever greater then 100.4, drainage, redness or bleeding from around the surgical site. Take over the counter stool softeners to help prevent constipation which may be a side effect of narcotic pain medications. You have a follow up appointment with Dr. Cowart on February 28th at 9:45 AM. Please call the office to reschedule if you are unable to keep this appointment call MD for any numbness or tingling, pain unrelieved by pain medications, nausea, vomiting, fever greater then 100.4, drainage, redness or bleeding from around the surgical site. Take over the counter stool softeners to help prevent constipation which may be a side effect of narcotic pain medications. You have a follow up appointment with Dr. Harley on February 28th at 9:45 AM, please keep post op dressing in place until this appointment.   Please call the office to reschedule if you are unable to keep this appointment.  Drink plenty of fluids.  Continue to do exercises and leg elevation as instructed. call MD for any numbness or tingling, pain unrelieved by pain medications, nausea, vomiting, fever greater then 100.4, drainage, redness or bleeding from around the surgical site. Take over the counter stool softeners to help prevent constipation which may be a side effect of narcotic pain medications. You have a follow up appointment with Dr. Harley on February 28th at 9:45 AM, please keep post op dressing in place until this appointment.   Please call the office to reschedule if you are unable to keep this appointment.  Drink plenty of fluids.  Continue to do exercises and leg elevation as instructed.  ontinue to follow consistent carb diet and follow up with PMD for continue management of your diabetes.

## 2020-02-16 NOTE — PROGRESS NOTE ADULT - PROBLEM SELECTOR PLAN 2
-in setting of surgery  -hgb trending down from 13.7 preop to 11.1-->10.6 postop  -cont to trend CBC, transfuse prn  -pt is hemodynamically stable.

## 2020-02-16 NOTE — PROGRESS NOTE ADULT - SUBJECTIVE AND OBJECTIVE BOX
Dr. Rocío Bird  Pager [phone]    PROGRESS NOTE:     Patient is a 64y old  Female who presents with a chief complaint of s/p surgery (15 Feb 2020 11:52)      SUBJECTIVE / OVERNIGHT EVENTS: pt denies chest pain or sob  ADDITIONAL REVIEW OF SYSTEMS: right knee pain controlled     MEDICATIONS  (STANDING):  acetaminophen   Tablet .. 975 milliGRAM(s) Oral every 8 hours  aspirin enteric coated 81 milliGRAM(s) Oral two times a day  budesonide 160 MICROgram(s)/formoterol 4.5 MICROgram(s) Inhaler 2 Puff(s) Inhalation two times a day  dextrose 5%. 1000 milliLiter(s) (50 mL/Hr) IV Continuous <Continuous>  dextrose 50% Injectable 12.5 Gram(s) IV Push once  dextrose 50% Injectable 25 Gram(s) IV Push once  dextrose 50% Injectable 25 Gram(s) IV Push once  insulin lispro (HumaLOG) corrective regimen sliding scale   SubCutaneous three times a day before meals  insulin lispro (HumaLOG) corrective regimen sliding scale   SubCutaneous at bedtime  pantoprazole    Tablet 40 milliGRAM(s) Oral before breakfast  polyethylene glycol 3350 17 Gram(s) Oral daily  sodium chloride 0.9% Bolus 500 milliLiter(s) IV Bolus once  sodium chloride 0.9% lock flush 3 milliLiter(s) IV Push every 8 hours    MEDICATIONS  (PRN):  aluminum hydroxide/magnesium hydroxide/simethicone Suspension 30 milliLiter(s) Oral four times a day PRN Indigestion  bisacodyl Suppository 10 milliGRAM(s) Rectal daily PRN If no bowel movement by postoperative day #2  dextrose 40% Gel 15 Gram(s) Oral once PRN Blood Glucose LESS THAN 70 milliGRAM(s)/deciliter  glucagon  Injectable 1 milliGRAM(s) IntraMuscular once PRN Glucose LESS THAN 70 milligrams/deciliter  HYDROmorphone  Injectable 0.5 milliGRAM(s) IV Push every 10 minutes PRN Moderate Pain (4 - 6)  HYDROmorphone  Injectable 1 milliGRAM(s) IV Push every 10 minutes PRN Severe Pain (7 - 10)  magnesium hydroxide Suspension 30 milliLiter(s) Oral daily PRN Constipation  meperidine     Injectable 12.5 milliGRAM(s) IV Push every 10 minutes PRN Shivering  ondansetron Injectable 4 milliGRAM(s) IV Push once PRN Nausea and/or Vomiting  ondansetron Injectable 4 milliGRAM(s) IV Push every 6 hours PRN Nausea and/or Vomiting  oxyCODONE    IR 5 milliGRAM(s) Oral every 4 hours PRN Moderate Pain (4 - 6)  oxyCODONE    IR 10 milliGRAM(s) Oral every 4 hours PRN Severe Pain (7 - 10)  senna 2 Tablet(s) Oral at bedtime PRN Constipation  traMADol 50 milliGRAM(s) Oral every 6 hours PRN Mild Pain (1 - 3)      CAPILLARY BLOOD GLUCOSE      POCT Blood Glucose.: 136 mg/dL (16 Feb 2020 11:33)  POCT Blood Glucose.: 104 mg/dL (16 Feb 2020 07:42)  POCT Blood Glucose.: 129 mg/dL (15 Feb 2020 22:25)  POCT Blood Glucose.: 111 mg/dL (15 Feb 2020 16:42)    I&O's Summary      PHYSICAL EXAM:  Vital Signs Last 24 Hrs  T(C): 36.7 (16 Feb 2020 09:23), Max: 37.8 (15 Feb 2020 22:32)  T(F): 98 (16 Feb 2020 09:23), Max: 100 (15 Feb 2020 22:32)  HR: 79 (16 Feb 2020 09:23) (79 - 95)  BP: 107/57 (16 Feb 2020 09:23) (107/57 - 129/51)  BP(mean): --  RR: 16 (16 Feb 2020 09:23) (16 - 16)  SpO2: 99% (16 Feb 2020 09:23) (96% - 100%)  GENERAL: NAD, well-developed  HEAD:  Atraumatic, Normocephalic  EYES: EOMI, PERRLA, conjunctiva and sclera clear  NECK: Supple, No JVD  CHEST/LUNG: Clear to auscultation bilaterally; No wheeze  HEART: Regular rate and rhythm; No murmurs, rubs, or gallops  ABDOMEN: Soft, Nontender, Nondistended; Bowel sounds present  EXTREMITIES:  2+ Peripheral Pulses, No clubbing, cyanosis, or edema; right TKR with dressing C/D/I  PSYCH: AAOx3  NEUROLOGY: non-focal  SKIN: No rashes or lesions    LABS:                        10.6   6.27  )-----------( 228      ( 15 Feb 2020 06:45 )             32.4     02-15    137  |  104  |  8   ----------------------------<  109<H>  4.1   |  30  |  0.62    Ca    8.6      15 Feb 2020 06:45        RADIOLOGY & ADDITIONAL TESTS:  Results Reviewed:   Imaging Personally Reviewed:  Electrocardiogram Personally Reviewed:    COORDINATION OF CARE:  Care Discussed with Consultants/Other Providers [Y/N]:  Prior or Outpatient Records Reviewed [Y/N]:

## 2020-02-16 NOTE — DISCHARGE NOTE NURSING/CASE MANAGEMENT/SOCIAL WORK - NSDPDISTO_GEN_ALL_CORE
stable, right knee dressing in place clean dry and intact, tolerating diet, voiding well oob with walker/Home with home care

## 2020-02-16 NOTE — PROGRESS NOTE ADULT - SUBJECTIVE AND OBJECTIVE BOX
POD #2 Right TKR    Complains of pain    Vital Signs Last 24 Hrs  T(C): 37.6 (16 Feb 2020 05:30), Max: 37.8 (15 Feb 2020 22:32)  T(F): 99.7 (16 Feb 2020 05:30), Max: 100 (15 Feb 2020 22:32)  HR: 88 (16 Feb 2020 05:30) (81 - 95)  BP: 126/56 (16 Feb 2020 05:30) (96/53 - 129/51)  BP(mean): --  RR: 16 (16 Feb 2020 05:30) (16 - 16)  SpO2: 98% (16 Feb 2020 05:30) (96% - 100%)                          10.6   6.27  )-----------( 228      ( 15 Feb 2020 06:45 )             32.4       Right knee dressing intact  Calves non tender  NV intact    Physical therapy  Pain control

## 2020-02-16 NOTE — PROGRESS NOTE ADULT - PROBLEM SELECTOR PLAN 4
-On Invokamet at home  -hold home med, humalog ISS, monitor FS  -glycemic control acceptable, A1c 5.6%

## 2020-02-16 NOTE — PROGRESS NOTE ADULT - PROBLEM SELECTOR PLAN 5
previously on Micardis, currently not on any antihypertensives preop  -BP soft likely d/t postop, blood loss  -avoid hypotension, IVF NS bolus prn hypotension.

## 2020-02-16 NOTE — PROGRESS NOTE ADULT - PROBLEM SELECTOR PLAN 1
-s/p right TKR on 2/14/20  -postop management per orthopedics, pain control (Oxy IR/dilaudid prn), PT, incentive spirometry, DVT ppx.  -D/C plan per Ortho

## 2020-02-17 ENCOUNTER — TRANSCRIPTION ENCOUNTER (OUTPATIENT)
Age: 65
End: 2020-02-17

## 2020-02-17 VITALS
HEART RATE: 91 BPM | OXYGEN SATURATION: 98 % | DIASTOLIC BLOOD PRESSURE: 58 MMHG | TEMPERATURE: 98 F | SYSTOLIC BLOOD PRESSURE: 111 MMHG | RESPIRATION RATE: 16 BRPM

## 2020-02-17 LAB
GLUCOSE BLDC GLUCOMTR-MCNC: 103 MG/DL — HIGH (ref 70–99)
GLUCOSE BLDC GLUCOMTR-MCNC: 92 MG/DL — SIGNIFICANT CHANGE UP (ref 70–99)

## 2020-02-17 PROCEDURE — 99232 SBSQ HOSP IP/OBS MODERATE 35: CPT

## 2020-02-17 RX ORDER — ACETAMINOPHEN 500 MG
3 TABLET ORAL
Qty: 126 | Refills: 0
Start: 2020-02-17 | End: 2020-03-01

## 2020-02-17 RX ORDER — GABAPENTIN 400 MG/1
1 CAPSULE ORAL
Qty: 42 | Refills: 0
Start: 2020-02-17 | End: 2020-03-01

## 2020-02-17 RX ORDER — PANTOPRAZOLE SODIUM 20 MG/1
1 TABLET, DELAYED RELEASE ORAL
Qty: 42 | Refills: 0
Start: 2020-02-17 | End: 2020-03-29

## 2020-02-17 RX ORDER — ASPIRIN/CALCIUM CARB/MAGNESIUM 324 MG
1 TABLET ORAL
Qty: 84 | Refills: 0
Start: 2020-02-17 | End: 2020-03-29

## 2020-02-17 RX ORDER — MELOXICAM 15 MG/1
1 TABLET ORAL
Qty: 14 | Refills: 0
Start: 2020-02-17 | End: 2020-03-01

## 2020-02-17 RX ORDER — SENNA PLUS 8.6 MG/1
2 TABLET ORAL
Qty: 28 | Refills: 0
Start: 2020-02-17 | End: 2020-03-01

## 2020-02-17 RX ORDER — DOCUSATE SODIUM 100 MG
1 CAPSULE ORAL
Qty: 42 | Refills: 0
Start: 2020-02-17 | End: 2020-03-01

## 2020-02-17 RX ORDER — OXYCODONE HYDROCHLORIDE 5 MG/1
2 TABLET ORAL
Qty: 84 | Refills: 0
Start: 2020-02-17 | End: 2020-02-23

## 2020-02-17 RX ORDER — TRAMADOL HYDROCHLORIDE 50 MG/1
1 TABLET ORAL
Qty: 28 | Refills: 0
Start: 2020-02-17 | End: 2020-02-23

## 2020-02-17 RX ADMIN — OXYCODONE HYDROCHLORIDE 10 MILLIGRAM(S): 5 TABLET ORAL at 09:10

## 2020-02-17 RX ADMIN — SODIUM CHLORIDE 3 MILLILITER(S): 9 INJECTION INTRAMUSCULAR; INTRAVENOUS; SUBCUTANEOUS at 06:11

## 2020-02-17 RX ADMIN — Medication 81 MILLIGRAM(S): at 06:11

## 2020-02-17 RX ADMIN — PANTOPRAZOLE SODIUM 40 MILLIGRAM(S): 20 TABLET, DELAYED RELEASE ORAL at 06:11

## 2020-02-17 RX ADMIN — OXYCODONE HYDROCHLORIDE 10 MILLIGRAM(S): 5 TABLET ORAL at 00:34

## 2020-02-17 RX ADMIN — BUDESONIDE AND FORMOTEROL FUMARATE DIHYDRATE 2 PUFF(S): 160; 4.5 AEROSOL RESPIRATORY (INHALATION) at 09:10

## 2020-02-17 RX ADMIN — Medication 975 MILLIGRAM(S): at 02:29

## 2020-02-17 RX ADMIN — Medication 975 MILLIGRAM(S): at 11:10

## 2020-02-17 NOTE — PROGRESS NOTE ADULT - SUBJECTIVE AND OBJECTIVE BOX
Dr. Rocío Bird  Pager [phone]    PROGRESS NOTE:     Patient is a 64y old  Female who presents with a chief complaint of right TKA (17 Feb 2020 06:38)      SUBJECTIVE / OVERNIGHT EVENTS: pt denies chest pain or sob   ADDITIONAL REVIEW OF SYSTEMS: right knee pain controlled, going home today    MEDICATIONS  (STANDING):  acetaminophen   Tablet .. 975 milliGRAM(s) Oral every 8 hours  aspirin enteric coated 81 milliGRAM(s) Oral two times a day  budesonide 160 MICROgram(s)/formoterol 4.5 MICROgram(s) Inhaler 2 Puff(s) Inhalation two times a day  dextrose 5%. 1000 milliLiter(s) (50 mL/Hr) IV Continuous <Continuous>  dextrose 50% Injectable 12.5 Gram(s) IV Push once  dextrose 50% Injectable 25 Gram(s) IV Push once  dextrose 50% Injectable 25 Gram(s) IV Push once  insulin lispro (HumaLOG) corrective regimen sliding scale   SubCutaneous three times a day before meals  insulin lispro (HumaLOG) corrective regimen sliding scale   SubCutaneous at bedtime  pantoprazole    Tablet 40 milliGRAM(s) Oral before breakfast  polyethylene glycol 3350 17 Gram(s) Oral daily  sodium chloride 0.9% Bolus 500 milliLiter(s) IV Bolus once  sodium chloride 0.9% lock flush 3 milliLiter(s) IV Push every 8 hours    MEDICATIONS  (PRN):  aluminum hydroxide/magnesium hydroxide/simethicone Suspension 30 milliLiter(s) Oral four times a day PRN Indigestion  bisacodyl Suppository 10 milliGRAM(s) Rectal daily PRN If no bowel movement by postoperative day #2  dextrose 40% Gel 15 Gram(s) Oral once PRN Blood Glucose LESS THAN 70 milliGRAM(s)/deciliter  glucagon  Injectable 1 milliGRAM(s) IntraMuscular once PRN Glucose LESS THAN 70 milligrams/deciliter  HYDROmorphone  Injectable 0.5 milliGRAM(s) IV Push every 10 minutes PRN Moderate Pain (4 - 6)  HYDROmorphone  Injectable 1 milliGRAM(s) IV Push every 10 minutes PRN Severe Pain (7 - 10)  magnesium hydroxide Suspension 30 milliLiter(s) Oral daily PRN Constipation  meperidine     Injectable 12.5 milliGRAM(s) IV Push every 10 minutes PRN Shivering  ondansetron Injectable 4 milliGRAM(s) IV Push once PRN Nausea and/or Vomiting  ondansetron Injectable 4 milliGRAM(s) IV Push every 6 hours PRN Nausea and/or Vomiting  oxyCODONE    IR 5 milliGRAM(s) Oral every 4 hours PRN Moderate Pain (4 - 6)  oxyCODONE    IR 10 milliGRAM(s) Oral every 4 hours PRN Severe Pain (7 - 10)  senna 2 Tablet(s) Oral at bedtime PRN Constipation  traMADol 50 milliGRAM(s) Oral every 6 hours PRN Mild Pain (1 - 3)      CAPILLARY BLOOD GLUCOSE      POCT Blood Glucose.: 92 mg/dL (17 Feb 2020 11:38)  POCT Blood Glucose.: 103 mg/dL (17 Feb 2020 07:28)  POCT Blood Glucose.: 107 mg/dL (16 Feb 2020 22:03)  POCT Blood Glucose.: 105 mg/dL (16 Feb 2020 16:37)    I&O's Summary    16 Feb 2020 07:01  -  17 Feb 2020 07:00  --------------------------------------------------------  IN: 800 mL / OUT: 0 mL / NET: 800 mL    17 Feb 2020 07:01  -  17 Feb 2020 11:46  --------------------------------------------------------  IN: 400 mL / OUT: 0 mL / NET: 400 mL        PHYSICAL EXAM:  Vital Signs Last 24 Hrs  T(C): 36.8 (17 Feb 2020 09:07), Max: 37.3 (16 Feb 2020 17:03)  T(F): 98.2 (17 Feb 2020 09:07), Max: 99.2 (16 Feb 2020 17:03)  HR: 91 (17 Feb 2020 09:07) (75 - 96)  BP: 111/58 (17 Feb 2020 09:07) (96/81 - 122/63)  BP(mean): --  RR: 16 (17 Feb 2020 09:07) (16 - 16)  SpO2: 98% (17 Feb 2020 09:07) (98% - 100%)  GENERAL: NAD, well-developed  HEAD:  Atraumatic, Normocephalic  EYES: EOMI, PERRLA, conjunctiva and sclera clear  NECK: Supple, No JVD  CHEST/LUNG: Clear to auscultation bilaterally; No wheeze  HEART: Regular rate and rhythm; No murmurs, rubs, or gallops  ABDOMEN: Soft, Nontender, Nondistended; Bowel sounds present  EXTREMITIES:  2+ Peripheral Pulses, No clubbing, cyanosis, or edema; right TKR with RLE dressing C/D/I  PSYCH: AAOx3  NEUROLOGY: non-focal    LABS:                      RADIOLOGY & ADDITIONAL TESTS:  Results Reviewed:   Imaging Personally Reviewed:  Electrocardiogram Personally Reviewed:    COORDINATION OF CARE:  Care Discussed with Consultants/Other Providers [Y/N]:  Prior or Outpatient Records Reviewed [Y/N]:

## 2020-02-17 NOTE — PROGRESS NOTE ADULT - SUBJECTIVE AND OBJECTIVE BOX
POD #3 Right TKR    Comfortable Walking with walker    Vital Signs Last 24 Hrs  T(C): 36.7 (17 Feb 2020 06:10), Max: 37.3 (16 Feb 2020 17:03)  T(F): 98 (17 Feb 2020 06:10), Max: 99.2 (16 Feb 2020 17:03)  HR: 75 (17 Feb 2020 06:10) (75 - 96)  BP: 122/63 (17 Feb 2020 06:10) (96/81 - 122/63)  BP(mean): --  RR: 16 (17 Feb 2020 06:10) (16 - 16)  SpO2: 100% (17 Feb 2020 06:10) (99% - 100%)    Right knee dressing intact.  Calf soft  NV intact    Discharge planning

## 2020-02-17 NOTE — DISCHARGE NOTE PROVIDER - NSDCMRMEDTOKEN_GEN_ALL_CORE_FT
acetaminophen 325 mg oral tablet: 3 tab(s) orally every 8 hours  Advair Diskus 250 mcg-50 mcg inhalation powder: 2 puff(s) inhaled 2 times a day  aspirin 81 mg oral delayed release tablet: 1 tab(s) orally 2 times a day  Colace 100 mg oral capsule: 1 cap(s) orally 3 times a day, As Needed for constipation  gabapentin 100 mg oral capsule: 1 cap(s) orally every 8 hours, As Needed for pain  Invokamet 150 mg-1000 mg oral tablet: 1 tab(s) orally once a day  Mobic 15 mg oral tablet: 1 tab(s) orally once a day   oxyCODONE 5 mg oral capsule: take 1-2 cap(s) orally every 4 to 6 hours as needed for pain MDD:12  pantoprazole 40 mg oral delayed release tablet: 1 tab(s) orally once a day (before a meal) continue while on Aspirin for DVT ppx  senna oral tablet: 2 tab(s) orally once a day (at bedtime), As needed, Constipation  traMADol 50 mg oral tablet: 1 tab(s) orally every 6 hours, As needed, Mild Pain (1 - 3) MDD:4

## 2020-02-17 NOTE — DISCHARGE NOTE PROVIDER - NSDCFUSCHEDAPPT_GEN_ALL_CORE_FT
GABRIELA REICH ; 02/28/2020 ; [facility] GABRIELA REICH ; 02/28/2020 ; [facility]

## 2020-02-17 NOTE — PROGRESS NOTE ADULT - SUBJECTIVE AND OBJECTIVE BOX
Patient seen and examined. Pain controlled. No acute events overnight. Patient walked with PT.      MEDICATIONS  (STANDING):  acetaminophen   Tablet .. 975 milliGRAM(s) Oral every 8 hours  aspirin enteric coated 81 milliGRAM(s) Oral two times a day  budesonide 160 MICROgram(s)/formoterol 4.5 MICROgram(s) Inhaler 2 Puff(s) Inhalation two times a day  dextrose 5%. 1000 milliLiter(s) IV Continuous <Continuous>  dextrose 50% Injectable 12.5 Gram(s) IV Push once  dextrose 50% Injectable 25 Gram(s) IV Push once  dextrose 50% Injectable 25 Gram(s) IV Push once  insulin lispro (HumaLOG) corrective regimen sliding scale   SubCutaneous three times a day before meals  insulin lispro (HumaLOG) corrective regimen sliding scale   SubCutaneous at bedtime  pantoprazole    Tablet 40 milliGRAM(s) Oral before breakfast  polyethylene glycol 3350 17 Gram(s) Oral daily  sodium chloride 0.9% Bolus 500 milliLiter(s) IV Bolus once  sodium chloride 0.9% lock flush 3 milliLiter(s) IV Push every 8 hours    Allergies    No Known Allergies    Intolerances                            10.6   6.27  )-----------( 228      ( 15 Feb 2020 06:45 )             32.4     15 Feb 2020 06:45    137    |  104    |  8      ----------------------------<  109    4.1     |  30     |  0.62     Ca    8.6        15 Feb 2020 06:45        Vital Signs Last 24 Hrs  T(C): 36.7 (02-17-20 @ 06:10), Max: 37.3 (02-16-20 @ 17:03)  T(F): 98 (02-17-20 @ 06:10), Max: 99.2 (02-16-20 @ 17:03)  HR: 75 (02-17-20 @ 06:10) (75 - 96)  BP: 122/63 (02-17-20 @ 06:10) (96/81 - 122/63)  BP(mean): --  RR: 16 (02-17-20 @ 06:10) (16 - 16)  SpO2: 100% (02-17-20 @ 06:10) (99% - 100%)    Physical Exam  Gen: NAD  RLE:   Dressing c/d/i  +ehl/fhl/ta/gs function  L2-S1 silt  Dp/pt pulse intact  No calf ttp  Compartments soft    A/P: 64y Female sp R TKA  Pain control  DVT ppx A81 BID  PT/WBAT/OOB  FU labs  Ice/elevate  Medical management appreciated  Incentive spirometry  Dispo planning, home

## 2020-02-17 NOTE — DISCHARGE NOTE PROVIDER - CARE PROVIDER_API CALL
Roman Harley)  Orthopaedic Surgery  [address] [facility] [address]  [place] [address]  Phone: [phone]  Fax: [phone]  Follow Up Time:

## 2020-02-17 NOTE — DISCHARGE NOTE PROVIDER - HOSPITAL COURSE
The patient is a 64y year old Female  status post elective total Knee Arthroplasty to the Right knee after failing outpatient nonoperative conservative management.  Patient presented to [facility] after being medically cleared for an elective surgical procedure. The patient was taken to the operating room on date mentioned above. Prophylactic antibiotics were started before the procedure and continued for 24 hours.  There were no complications during the procedure and patient tolerated the procedure well.  The patient was transferred to recovery room in stable condition and subsequently to surgical floor.  Patient was placed on ASA 81 BID for anticoagulation.  All home medications were continued.  The patient received physical therapy daily and daily labs were followed.  The dressing was kept clean, dry, intact. The rest of the hospital stay was unremarkable. The patient was discharged to home with outpatient follow up and ASA 81 BID for DVT ppx

## 2020-02-17 NOTE — PROGRESS NOTE ADULT - PROBLEM SELECTOR PLAN 4
-On Invokamet at home  -hold home med, humalog ISS, monitor FS  -glycemic control acceptable, A1c 5.6% -On Invokamet at home  -resume home med on D/C, humalog ISS, monitor FS  -glycemic control acceptable, A1c 5.6%

## 2020-02-17 NOTE — PROGRESS NOTE ADULT - PROBLEM SELECTOR PLAN 5
not on any antihypertensives  -BP soft likely d/t postop, blood loss  -avoid hypotension, IVF NS bolus prn hypotension.

## 2020-02-17 NOTE — DISCHARGE NOTE PROVIDER - NSDCCPGOAL_GEN_ALL_CORE_FT
To get better and follow your care plan as instructed.  1.	Pain Control  2.	Walking with full weight bearing as tolerated, with assistive devices (walker/Cane as Needed)  3.	DVT Prophylaxis ASA 81 bid  4.	PT as needed  5.	Follow up with Dr. Harley as Outpatient in 10-14 Days after Discharge from the Hospital. Call Office For Appointment.   6.	Ice plenty  7.	OK to shower as long as Aquacel Bandage is used for wound.

## 2020-02-19 LAB — SURGICAL PATHOLOGY STUDY: SIGNIFICANT CHANGE UP

## 2020-02-21 DIAGNOSIS — M17.11 UNILATERAL PRIMARY OSTEOARTHRITIS, RIGHT KNEE: ICD-10-CM

## 2020-02-28 ENCOUNTER — APPOINTMENT (OUTPATIENT)
Dept: ORTHOPEDIC SURGERY | Facility: CLINIC | Age: 65
End: 2020-02-28
Payer: COMMERCIAL

## 2020-02-28 VITALS
SYSTOLIC BLOOD PRESSURE: 132 MMHG | DIASTOLIC BLOOD PRESSURE: 71 MMHG | HEART RATE: 90 BPM | HEIGHT: 59 IN | BODY MASS INDEX: 24.19 KG/M2 | WEIGHT: 120 LBS

## 2020-02-28 PROCEDURE — 73562 X-RAY EXAM OF KNEE 3: CPT | Mod: RT

## 2020-02-28 PROCEDURE — 99024 POSTOP FOLLOW-UP VISIT: CPT

## 2020-03-30 ENCOUNTER — APPOINTMENT (OUTPATIENT)
Dept: ORTHOPEDIC SURGERY | Facility: CLINIC | Age: 65
End: 2020-03-30

## 2020-04-09 ENCOUNTER — APPOINTMENT (OUTPATIENT)
Dept: ORTHOPEDIC SURGERY | Facility: CLINIC | Age: 65
End: 2020-04-09
Payer: COMMERCIAL

## 2020-04-09 VITALS
HEART RATE: 80 BPM | TEMPERATURE: 98.1 F | WEIGHT: 120 LBS | BODY MASS INDEX: 24.19 KG/M2 | DIASTOLIC BLOOD PRESSURE: 75 MMHG | SYSTOLIC BLOOD PRESSURE: 125 MMHG | HEIGHT: 59 IN

## 2020-04-09 PROCEDURE — 99024 POSTOP FOLLOW-UP VISIT: CPT

## 2020-05-11 ENCOUNTER — APPOINTMENT (OUTPATIENT)
Dept: ORTHOPEDIC SURGERY | Facility: CLINIC | Age: 65
End: 2020-05-11
Payer: COMMERCIAL

## 2020-05-11 VITALS
WEIGHT: 120 LBS | TEMPERATURE: 98.4 F | HEART RATE: 80 BPM | BODY MASS INDEX: 24.19 KG/M2 | SYSTOLIC BLOOD PRESSURE: 119 MMHG | DIASTOLIC BLOOD PRESSURE: 78 MMHG | HEIGHT: 59 IN

## 2020-05-11 PROCEDURE — 99024 POSTOP FOLLOW-UP VISIT: CPT

## 2020-06-22 ENCOUNTER — APPOINTMENT (OUTPATIENT)
Dept: ORTHOPEDIC SURGERY | Facility: CLINIC | Age: 65
End: 2020-06-22
Payer: COMMERCIAL

## 2020-06-22 VITALS
BODY MASS INDEX: 25.82 KG/M2 | HEART RATE: 78 BPM | SYSTOLIC BLOOD PRESSURE: 122 MMHG | WEIGHT: 123 LBS | HEIGHT: 58 IN | DIASTOLIC BLOOD PRESSURE: 84 MMHG | TEMPERATURE: 98.2 F

## 2020-06-22 DIAGNOSIS — Z96.651 PRESENCE OF RIGHT ARTIFICIAL KNEE JOINT: ICD-10-CM

## 2020-06-22 PROCEDURE — 99213 OFFICE O/P EST LOW 20 MIN: CPT

## 2020-09-22 ENCOUNTER — APPOINTMENT (OUTPATIENT)
Dept: ORTHOPEDIC SURGERY | Facility: CLINIC | Age: 65
End: 2020-09-22

## 2021-01-07 NOTE — OCCUPATIONAL THERAPY INITIAL EVALUATION ADULT - PERTINENT HX OF CURRENT PROBLEM, REHAB EVAL
Hospital Medicine History & Physical Note    Date of Service  1/6/2021    Primary Care Physician  Ella Matthew M.D.    Consultants  None    Code Status  Full Code    Chief Complaint  Chief Complaint   Patient presents with   • Abnormal Labs     Pt had transfusion scheduled for 9th, was called by transfusion center today and told to come to ED for to Hgb of 4.9. Pt denies lightheadedness, sob. Pt pale.        History of Presenting Illness  25 y.o. female with complicated medical history, including lupus, end-stage renal disease on hemodialysis Monday Wednesday Friday, severe chronic anemia, peptic ulcer disease, GI bleed, seizure, hypertension, who presented 1/6/2021 with generalized weakness.  She was sent to emergency department by transfusion RN after her CBC showed hemoglobin 4.6 today.  Beside generalized weakness and nausea, she denies any other complaints.  She denies melena, vomiting, hematuria, blood in stool, chest pain, stomach pain, shortness of breath.  Patient has been getting RBC transfusions, 2 units every 2 weeks at infusion center, following with Dr. Garcia for anemia.  She was evaluated in the past with endoscopies for GI bleed.  Patient had dialysis today.  RBC transfusion ordered, but it may take time to find appropriate blood due to presence of antibodies.    Review of Systems  Review of Systems   Constitutional: Negative for chills, fever and weight loss.   HENT: Negative for ear pain, hearing loss and tinnitus.    Eyes: Negative for blurred vision, double vision and photophobia.   Respiratory: Negative for cough, hemoptysis and sputum production.    Cardiovascular: Negative for chest pain, palpitations and orthopnea.   Gastrointestinal: Positive for nausea. Negative for abdominal pain, blood in stool, constipation, diarrhea, heartburn, melena and vomiting.   Genitourinary: Negative for dysuria, flank pain, frequency and hematuria.   Musculoskeletal: Negative for back pain, joint pain and  neck pain.   Skin: Negative for itching and rash.   Neurological: Positive for weakness (Generalized). Negative for tremors, speech change, focal weakness and headaches.   Endo/Heme/Allergies: Negative for environmental allergies and polydipsia. Does not bruise/bleed easily.   Psychiatric/Behavioral: Negative for hallucinations and substance abuse. The patient is not nervous/anxious.        Past Medical History   has a past medical history of Anemia (01/17/2018), AVF (arteriovenous fistula) ([facility]), Dialysis patient ([facility]), ESRD (end stage renal disease) on dialysis ([facility]) (01/17/2018), Heart burn, Hypertension (01/17/2018), Indigestion, Lupus ([facility]), Migraines (01/17/2018), and Seizure ([facility]) (2013).    Surgical History   has a past surgical history that includes ronak by laparoscopy (4/5/2010); av fistula creation (Right); angioplasty (01/17/2018); other; other; gastroscopy-endo (12/9/2019); gastroscopy (N/A, 5/30/2020); gastroscopy-endo (9/18/2020); pr upper gi endoscopy,ctrl bleed (11/12/2020); pr upper gi endoscopy,biopsy (11/12/2020); and gastroscopy-endo (11/12/2020).     Family History  family history includes Diabetes in her paternal grandmother.     Social History   reports that she has never smoked. She has never used smokeless tobacco. She reports that she does not drink alcohol or use drugs.    Allergies  Allergies   Allergen Reactions   • Clindamycin Rash     Hive   • Keflex Rash     Hives   • Methylprednisolone      Anxious;   • Metoprolol Nausea       Medications  Prior to Admission Medications   Prescriptions Last Dose Informant Patient Reported? Taking?   acetaminophen (TYLENOL) 500 MG Tab  Patient Yes No   Sig: Take 500 mg by mouth every 6 hours as needed for Moderate Pain.   amLODIPine (NORVASC) 10 MG Tab  Patient Yes No   Sig: Take 10 mg by mouth every evening.   atenolol (TENORMIN) 25 MG Tab  Patient Yes No   Sig: Take 25 mg by mouth every evening.   hydroxychloroquine (PLAQUENIL) 200 MG Tab  Patient  Yes No   Sig: Take 200 mg by mouth every evening.   mycophenolate (MYFORTIC) 180 MG EC tablet  Patient No No   Sig: Take 1 Tab by mouth every evening.   omeprazole (PRILOSEC) 20 MG delayed-release capsule  Patient No No   Sig: Take 1 Cap by mouth 2 times a day.   ondansetron (ZOFRAN ODT) 4 MG TABLET DISPERSIBLE  Patient No No   Sig: Take 1 Tab by mouth every four hours as needed for Nausea.   Patient not taking: Reported on 12/2/2020   predniSONE (DELTASONE) 5 MG Tab  Patient Yes No   Sig: Take 5 mg by mouth every evening.   senna-docusate (PERICOLACE OR SENOKOT S) 8.6-50 MG Tab  Patient No No   Sig: Take 2 Tabs by mouth every day.   Patient taking differently: Take 2 Tabs by mouth every evening.      Facility-Administered Medications: None       Physical Exam  Temp:  [37.3 °C (99.1 °F)] 37.3 °C (99.1 °F)  Pulse:  [102-114] 114  Resp:  [16-32] 26  BP: (102-114)/(59-77) 114/69  SpO2:  [95 %-99 %] 95 %    Physical Exam  Vitals signs and nursing note reviewed.   Constitutional:       General: She is not in acute distress.     Appearance: Normal appearance.   HENT:      Head: Normocephalic and atraumatic.      Nose: Nose normal.      Mouth/Throat:      Mouth: Mucous membranes are moist.   Eyes:      Extraocular Movements: Extraocular movements intact.      Pupils: Pupils are equal, round, and reactive to light.   Neck:      Musculoskeletal: Normal range of motion and neck supple.   Cardiovascular:      Rate and Rhythm: Normal rate and regular rhythm.   Pulmonary:      Effort: Pulmonary effort is normal.      Breath sounds: Normal breath sounds.   Abdominal:      General: Abdomen is flat. There is no distension.      Tenderness: There is no abdominal tenderness.   Musculoskeletal: Normal range of motion.         General: No swelling or deformity.      Comments: Right arm fistula   Skin:     General: Skin is warm and dry.      Coloration: Skin is pale.   Neurological:      General: No focal deficit present.      Mental  Status: She is alert and oriented to person, place, and time.   Psychiatric:         Mood and Affect: Mood normal.         Behavior: Behavior normal.         Laboratory:  Recent Labs     01/06/21  1453 01/06/21 1950   WBC 4.4* 4.3*   RBC 1.57* 1.55*   HEMOGLOBIN 4.6* 4.6*   HEMATOCRIT 15.7* 15.8*   .0* 101.3*   MCH 28.7 29.0   MCHC 28.7* 28.7*   RDW 59.8* 59.9*   PLATELETCT 39* 47*   MPV 13.6* 12.7     Recent Labs     01/06/21 1950   SODIUM 134*   POTASSIUM 4.4   CHLORIDE 91*   CO2 32   GLUCOSE 96   BUN 25*   CREATININE 3.35*   CALCIUM 8.6     Recent Labs     01/06/21 1950   ALTSGPT 11   ASTSGOT 29   ALKPHOSPHAT 70   TBILIRUBIN 0.3   GLUCOSE 96     Recent Labs     01/06/21 1950   APTT 28.9   INR 1.16*     No results for input(s): NTPROBNP in the last 72 hours.      No results for input(s): TROPONINT in the last 72 hours.    Imaging:  DX-CHEST-PORTABLE (1 VIEW)    (Results Pending)         Assessment/Plan:  I anticipate this patient is appropriate for observation status at this time.    Anemia, chronic disease- (present on admission)  Assessment & Plan  Multifactorial: End-stage renal disease, lupus.  She has history of GI bleed, but currently no indication for any bleeding.  RBC transfusion pending  We will repeat iron studies, vitamin B12, folic acid, reticulocyte count  Follow with hematology Dr Garcia as outpatient; follow-up with nephrology for erythropoietin    Lupus (HCC)- (present on admission)  Assessment & Plan  Resume outpatient medications  Follow-up with rheumatology    ESRD (end stage renal disease) on dialysis (HCC)- (present on admission)  Assessment & Plan  Patient had hemodialysis today.  There is no indication for urgent hemodialysis  Continue outpatient hemodialysis after discharge     64 year old female presents s/p right total knee replacement on 2/14/2020.  Patient with complaint of right knee pain for several years that has progressively worsened.

## 2021-06-23 NOTE — PHYSICAL THERAPY INITIAL EVALUATION ADULT - REFERRING PHYSICIAN, REHAB EVAL
06/25/21      Santy Pena  [address]  [address] [facility] [address]      Take extra short acting insulin, in addition to the pre-meal insulin, according to this sliding scale:     For blood sugar 150-199 take extra 2 units of short acting insulin.  For blood sugar 200-249 take extra 4 units of short acting insulin.  For blood sugar 250-299 take extra 6 units of short acting insulin.  For blood sugar 300-349 take extra 8 units of short acting insulin.  For blood sugar 350-400 take extra 10 units of short acting insulin.     Please let us, or your endocrinologist know if you have any additional questions or concerns.      Sincerely,   Rafita Gu MD   [facility]  [address]  [address]  [facility] [address]  Phone: [phone]  Fax: [phone]              
Dr. Leslie Orta
parent(s)

## 2021-08-03 NOTE — H&P PST ADULT - VENOUS THROMBOEMBOLISM AGE
Pt receptive to engagement in consult and agreeable to reviewing healthy drinking guidelines. This writer reviewed "Rethink Drinking" booklet from [employer] - [place] Department of [employer] and Human Services. Pt declines interest in connecting to inpt/outpt or AA meetings at this time. Pt declines need for resources or literature.  Pt noted that he completed [employer] residential program (2 years) on July 3rd 2021. Pt noted he does not wish for this writer to outreach his previous program. Pt stated he will connect to a local outpt facility on his own.  60-75 yrs

## 2021-12-03 NOTE — H&P PST ADULT - EYES
Not applicable: This is a surgical and/or non-medical patient detailed exam PERRL/EOMI/conjunctiva clear/pupil L/pupil R

## 2022-01-02 ENCOUNTER — OUTPATIENT (OUTPATIENT)
Dept: OUTPATIENT SERVICES | Facility: HOSPITAL | Age: 67
LOS: 1 days | End: 2022-01-02
Payer: COMMERCIAL

## 2022-01-02 DIAGNOSIS — Z98.890 OTHER SPECIFIED POSTPROCEDURAL STATES: Chronic | ICD-10-CM

## 2022-01-02 DIAGNOSIS — Z11.52 ENCOUNTER FOR SCREENING FOR COVID-19: ICD-10-CM

## 2022-01-02 LAB — SARS-COV-2 RNA SPEC QL NAA+PROBE: SIGNIFICANT CHANGE UP

## 2022-01-02 PROCEDURE — C9803: CPT

## 2022-01-02 PROCEDURE — U0003: CPT

## 2022-01-02 PROCEDURE — U0005: CPT

## 2022-06-28 NOTE — DISCHARGE NOTE PROVIDER - NSDCQMCOGNITION_NEU_ALL_CORE
Allison Mcfarland presents to Urgent Care Patient arriving with: with care giver with complaint of Sore throat,  Lear pain x 1 day. Patient and visitor wearing mask?  Yes    Myself mask    Can leave detailed message on   mobile phone:    Mobile [phone] No difficulties

## 2023-03-08 NOTE — ASU PATIENT PROFILE, ADULT - NS TRANSFER HEARING AID
[address]  [facility] EM[facility]GENCY DEPT  [address] [address]  [phone]    Work/School Note    Date: 3/8/2023    To Whom It May concern:      Mya Walden was seen and treated today in the emergency room by the following provider(s):  Attending Provider: Julissa Goodson MD.      Mya Walden is excused from work/school on 03/09/23. He is clear to return to work/school on 03/10/23.         Sincerely,          Danita Franz MD n/a

## 2023-03-24 NOTE — DISCHARGE NOTE ADULT - MEDICATION SUMMARY - MEDICATIONS TO CHANGE
Detail Level: Detailed Quality 226: Preventive Care And Screening: Tobacco Use: Screening And Cessation Intervention: Patient screened for tobacco use and is an ex/non-smoker Quality 394b: Td/Tdap Immunizations For Adolescents: Patient had one tetanus, diphtheria toxoids vaccine (Td) on or between the patient's 10th and 13th birthdays. Quality 110: Preventive Care And Screening: Influenza Immunization: Influenza immunization was not ordered or administered, reason not given I will SWITCH the dose or number of times a day I take the medications listed below when I get home from the hospital:  None

## 2023-06-08 NOTE — PHYSICAL THERAPY INITIAL EVALUATION ADULT - LEVEL OF INDEPENDENCE: SIT/STAND, REHAB EVAL
minimum assist (75% patients effort) Metronidazole Pregnancy And Lactation Text: This medication is Pregnancy Category B and considered safe during pregnancy.  It is also excreted in breast milk.

## 2023-08-18 ENCOUNTER — APPOINTMENT (OUTPATIENT)
Dept: OTOLARYNGOLOGY | Facility: CLINIC | Age: 68
End: 2023-08-18
Payer: MEDICARE

## 2023-08-18 VITALS
HEART RATE: 82 BPM | HEIGHT: 58 IN | SYSTOLIC BLOOD PRESSURE: 104 MMHG | WEIGHT: 125 LBS | BODY MASS INDEX: 26.24 KG/M2 | TEMPERATURE: 97.8 F | DIASTOLIC BLOOD PRESSURE: 65 MMHG

## 2023-08-18 DIAGNOSIS — J32.2 CHRONIC ETHMOIDAL SINUSITIS: ICD-10-CM

## 2023-08-18 PROCEDURE — 31231 NASAL ENDOSCOPY DX: CPT

## 2023-08-18 PROCEDURE — 92567 TYMPANOMETRY: CPT

## 2023-08-18 PROCEDURE — 99204 OFFICE O/P NEW MOD 45 MIN: CPT | Mod: 25

## 2023-08-18 PROCEDURE — 92557 COMPREHENSIVE HEARING TEST: CPT

## 2023-08-18 RX ORDER — AMOXICILLIN AND CLAVULANATE POTASSIUM 500; 125 MG/1; MG/1
500-125 TABLET, FILM COATED ORAL TWICE DAILY
Qty: 14 | Refills: 0 | Status: ACTIVE | COMMUNITY
Start: 2023-08-18 | End: 1900-01-01

## 2023-08-18 RX ORDER — AZELASTINE HYDROCHLORIDE 137 UG/1
137 SPRAY, METERED NASAL TWICE DAILY
Qty: 1 | Refills: 5 | Status: ACTIVE | COMMUNITY
Start: 2023-08-18 | End: 1900-01-01

## 2023-08-18 RX ORDER — FLUTICASONE PROPIONATE 50 UG/1
50 SPRAY, METERED NASAL TWICE DAILY
Qty: 1 | Refills: 5 | Status: ACTIVE | COMMUNITY
Start: 2023-08-18 | End: 1900-01-01

## 2023-08-18 NOTE — ASSESSMENT
[FreeTextEntry1] : asymmetric SNHL: - MRI IAC  right PEDRO, sinusitis: - suspect ETD is from her sinuses; will treat with 1 week of augmentin, start saline rinse 1-2 times a day, and azelastine/flonase 1 spray BID - CT sinus to eval the sinuses since this is ongoing since Jan - will see if improves with this regimen; if not consider BMT +/- sinus surgery

## 2023-08-18 NOTE — HISTORY OF PRESENT ILLNESS
[de-identified] : 69yo female with ear fullness since Jan of this year, after she had COVID in Dec. She saw an outside ENT and was given prednisone 40mg and a nose spray. No irrigations. She feels the nose is congested and her sinuses are flaring up. She has not had any antibiotics.

## 2023-08-18 NOTE — CONSULT LETTER
[Dear  ___] : Dear  [unfilled], [Consult Letter:] : I had the pleasure of evaluating your patient, [unfilled]. [Please see my note below.] : Please see my note below. [Consult Closing:] : Thank you very much for allowing me to participate in the care of this patient.  If you have any questions, please do not hesitate to contact me. [Sincerely,] : Sincerely, [FreeTextEntry3] : Tammy Huber MD Otolaryngology and Cranial Base Surgery Attending Physician - Department of Otolaryngology and Head & Neck Surgery [facility]  [employer] [facility]

## 2023-08-18 NOTE — PHYSICAL EXAM
[de-identified] : right clear effusion, left intact without effusion [de-identified] : inferior turbinate hypertrophy [Normal] : mucosa is normal [Midline] : trachea located in midline position

## 2023-08-18 NOTE — PROCEDURE
[FreeTextEntry6] : Reason for nasal endoscopy: anterior rhinoscopy insufficient to account for symptoms.  Flexible scope #2 was used. Right nasal passage with normal inferior, middle and superior turbinates. Nasal passage patent with thick cloudy mucoid secretions at middle meatus and sphenoethmoid recess. Left nasal passage with normal inferior, middle and superior turbinates. Nasal passage was patent with clear middle meatus and sphenoethmoid recess. No mucopurulence or polyps appreciated. Nasopharynx clear. B/l ET orifices are clear but with secretions from right nasal cavity dripping back.

## 2023-09-17 ENCOUNTER — OUTPATIENT (OUTPATIENT)
Dept: OUTPATIENT SERVICES | Facility: HOSPITAL | Age: 68
LOS: 1 days | End: 2023-09-17
Payer: MEDICARE

## 2023-09-17 ENCOUNTER — APPOINTMENT (OUTPATIENT)
Dept: MRI IMAGING | Facility: CLINIC | Age: 68
End: 2023-09-17
Payer: MEDICARE

## 2023-09-17 DIAGNOSIS — Z98.890 OTHER SPECIFIED POSTPROCEDURAL STATES: Chronic | ICD-10-CM

## 2023-09-17 DIAGNOSIS — H90.3 SENSORINEURAL HEARING LOSS, BILATERAL: ICD-10-CM

## 2023-09-17 PROCEDURE — 70553 MRI BRAIN STEM W/O & W/DYE: CPT

## 2023-09-17 PROCEDURE — A9579: CPT

## 2023-09-17 PROCEDURE — 70553 MRI BRAIN STEM W/O & W/DYE: CPT | Mod: 26

## 2023-09-18 ENCOUNTER — NON-APPOINTMENT (OUTPATIENT)
Age: 68
End: 2023-09-18

## 2023-09-25 ENCOUNTER — APPOINTMENT (OUTPATIENT)
Dept: CT IMAGING | Facility: CLINIC | Age: 68
End: 2023-09-25
Payer: MEDICARE

## 2023-09-25 ENCOUNTER — OUTPATIENT (OUTPATIENT)
Dept: OUTPATIENT SERVICES | Facility: HOSPITAL | Age: 68
LOS: 1 days | End: 2023-09-25
Payer: MEDICARE

## 2023-09-25 DIAGNOSIS — Z98.890 OTHER SPECIFIED POSTPROCEDURAL STATES: Chronic | ICD-10-CM

## 2023-09-25 DIAGNOSIS — H90.3 SENSORINEURAL HEARING LOSS, BILATERAL: ICD-10-CM

## 2023-09-25 PROCEDURE — 70486 CT MAXILLOFACIAL W/O DYE: CPT

## 2023-09-25 PROCEDURE — 70486 CT MAXILLOFACIAL W/O DYE: CPT | Mod: 26

## 2023-09-29 ENCOUNTER — APPOINTMENT (OUTPATIENT)
Dept: OTOLARYNGOLOGY | Facility: CLINIC | Age: 68
End: 2023-09-29
Payer: MEDICARE

## 2023-09-29 DIAGNOSIS — J01.01 ACUTE RECURRENT MAXILLARY SINUSITIS: ICD-10-CM

## 2023-09-29 DIAGNOSIS — J32.0 CHRONIC MAXILLARY SINUSITIS: ICD-10-CM

## 2023-09-29 PROCEDURE — 31231 NASAL ENDOSCOPY DX: CPT

## 2023-09-29 PROCEDURE — 99213 OFFICE O/P EST LOW 20 MIN: CPT | Mod: 25

## 2023-09-29 PROCEDURE — 92567 TYMPANOMETRY: CPT

## 2023-09-29 PROCEDURE — 92557 COMPREHENSIVE HEARING TEST: CPT

## 2023-10-04 ENCOUNTER — NON-APPOINTMENT (OUTPATIENT)
Age: 68
End: 2023-10-04

## 2023-10-04 RX ORDER — PREDNISONE 10 MG/1
10 TABLET ORAL
Qty: 20 | Refills: 0 | Status: ACTIVE | COMMUNITY
Start: 2023-10-04 | End: 1900-01-01

## 2023-10-04 RX ORDER — DOXYCYCLINE HYCLATE 100 MG/1
100 CAPSULE ORAL
Qty: 20 | Refills: 0 | Status: ACTIVE | COMMUNITY
Start: 2023-10-04 | End: 1900-01-01

## 2023-12-22 ENCOUNTER — APPOINTMENT (OUTPATIENT)
Dept: OTOLARYNGOLOGY | Facility: CLINIC | Age: 68
End: 2023-12-22
Payer: MEDICARE

## 2023-12-22 VITALS
HEIGHT: 58 IN | HEART RATE: 69 BPM | TEMPERATURE: 97.8 F | WEIGHT: 126 LBS | SYSTOLIC BLOOD PRESSURE: 126 MMHG | BODY MASS INDEX: 26.45 KG/M2 | DIASTOLIC BLOOD PRESSURE: 73 MMHG

## 2023-12-22 DIAGNOSIS — H90.3 SENSORINEURAL HEARING LOSS, BILATERAL: ICD-10-CM

## 2023-12-22 DIAGNOSIS — H65.493 OTHER CHRONIC NONSUPPURATIVE OTITIS MEDIA, BILATERAL: ICD-10-CM

## 2023-12-22 PROCEDURE — 92567 TYMPANOMETRY: CPT

## 2023-12-22 PROCEDURE — 99213 OFFICE O/P EST LOW 20 MIN: CPT

## 2023-12-22 PROCEDURE — 92557 COMPREHENSIVE HEARING TEST: CPT

## 2023-12-22 NOTE — END OF VISIT
[FreeTextEntry3] : I personally saw and examined Ms. GABRIELA REICH  in detail this visit today. I personally reviewed the HPI, PMH, FH, SH, ROS and medications/allergies. I have spoken to RUI Mensah regarding the history and have personally determined the assessment and plan of care, and documented this myself. I was present and participated in all key portions of the encounter and all procedures noted above. I have made changes in the body of the note where appropriate.  Attesting Faculty: See Attending Signature Below

## 2023-12-22 NOTE — ASSESSMENT
[FreeTextEntry1] : 68 year F with Right PEDRO: - she still feels right ear sx although her tymps are now normal bilaterally and have shown trend of improvement over the past 3 audios - cont Flonase and Azelastine.  - use neosynephrine prior to flight - if symptoms worsen return for possible tube - yearly f/u or PRN

## 2023-12-22 NOTE — HISTORY OF PRESENT ILLNESS
[de-identified] : Patient complains her right ear still feels clogged and congested. She feels as if she just off a plane. She is using Flonase and Azelastine daily. She notes the colder its outside the worse it gets. She notes she has a flight to go on in end of January for business.

## 2024-08-11 NOTE — OCCUPATIONAL THERAPY INITIAL EVALUATION ADULT - GENERAL OBSERVATIONS, REHAB EVAL
Problem: Pain - Adult  Goal: Verbalizes/displays adequate comfort level or baseline comfort level  Outcome: Progressing     Problem: Safety - Adult  Goal: Free from fall injury  Outcome: Progressing     Problem: Discharge Planning  Goal: Discharge to home or other facility with appropriate resources  Outcome: Progressing     Problem: Chronic Conditions and Co-morbidities  Goal: Patient's chronic conditions and co-morbidity symptoms are monitored and maintained or improved  Outcome: Progressing     Problem: Pain  Goal: Takes deep breaths with improved pain control throughout the shift  Outcome: Progressing  Goal: Turns in bed with improved pain control throughout the shift  Outcome: Progressing  Goal: Walks with improved pain control throughout the shift  Outcome: Progressing  Goal: Performs ADL's with improved pain control throughout shift  Outcome: Progressing  Goal: Participates in PT with improved pain control throughout the shift  Outcome: Progressing  Goal: Free from opioid side effects throughout the shift  Outcome: Progressing  Goal: Free from acute confusion related to pain meds throughout the shift  Outcome: Progressing   The patient's goals for the shift include      The clinical goals for the shift include remain safe and free of falls and injuries     Pt. received standing with rolling walker, +daughter present. No acute distress. Patient agreed to evaluation from Occupational Therapist. +Clean dry intact dressing to Left Knee, +Heplock. Daughter present bedside.

## 2024-12-20 NOTE — PATIENT PROFILE ADULT - NSPROGENSOURCEINFO_GEN_A_NUR
This Community Health Worker (CHW) completed Social Determinant of Health (SDOH)  Questionnaire with patient/caregiver via telephone today.  Notified OPCM CM Sue Styles RN of completion.      Patient denied any SDOH needs at this time due to living in the sisters mothers house.      patient

## 2025-02-07 NOTE — H&P PST ADULT - HIV STATUS
Janie Santiago is a 38 y.o. female patient.    Temp: 98.2 °F (36.8 °C) (02/07/25 1345)  Pulse: 97 (02/07/25 1350)  BP: 99/68 (02/07/25 1415)  SpO2: 100 % (02/07/25 1350)       Obtain Fetal nonstress test (NST)    Date/Time: 2/7/2025 2:43 PM    Performed by: Breonna Bermeo CNM  Authorized by: Breonna Bermeo CNM    Nonstress Test:     Variability:  6-25 BPM    Decelerations:  None    Accelerations:  15 bpm    Acoustic Stimulator: No      Uterine Irritability: No      Contractions:  Not present  Biophysical Profile:     Nonstress Test Interpretation: reactive      Overall Impression:  Reassuring  Post-procedure:     Patient tolerance:  Patient tolerated the procedure well with no immediate complications      2/7/2025     Negative/Unknown

## 2025-08-29 ENCOUNTER — APPOINTMENT (OUTPATIENT)
Dept: GASTROENTEROLOGY | Facility: CLINIC | Age: 70
End: 2025-08-29